# Patient Record
Sex: MALE | Race: BLACK OR AFRICAN AMERICAN | NOT HISPANIC OR LATINO | Employment: FULL TIME | URBAN - METROPOLITAN AREA
[De-identification: names, ages, dates, MRNs, and addresses within clinical notes are randomized per-mention and may not be internally consistent; named-entity substitution may affect disease eponyms.]

---

## 2024-08-26 ENCOUNTER — OFFICE VISIT (OUTPATIENT)
Dept: PRIMARY CARE | Facility: CLINIC | Age: 34
End: 2024-08-26
Payer: COMMERCIAL

## 2024-08-26 VITALS
SYSTOLIC BLOOD PRESSURE: 128 MMHG | BODY MASS INDEX: 43.88 KG/M2 | DIASTOLIC BLOOD PRESSURE: 91 MMHG | HEIGHT: 71 IN | TEMPERATURE: 97.5 F | RESPIRATION RATE: 16 BRPM | WEIGHT: 313.4 LBS | HEART RATE: 112 BPM | OXYGEN SATURATION: 98 %

## 2024-08-26 DIAGNOSIS — I10 PRIMARY HYPERTENSION: ICD-10-CM

## 2024-08-26 DIAGNOSIS — R00.0 TACHYCARDIA: Primary | ICD-10-CM

## 2024-08-26 DIAGNOSIS — E66.01 CLASS 3 SEVERE OBESITY DUE TO EXCESS CALORIES WITH BODY MASS INDEX (BMI) OF 40.0 TO 44.9 IN ADULT, UNSPECIFIED WHETHER SERIOUS COMORBIDITY PRESENT (MULTI): ICD-10-CM

## 2024-08-26 DIAGNOSIS — D64.9 ANEMIA, UNSPECIFIED TYPE: ICD-10-CM

## 2024-08-26 DIAGNOSIS — R79.89 LOW VITAMIN D LEVEL: ICD-10-CM

## 2024-08-26 DIAGNOSIS — F41.9 ANXIETY: ICD-10-CM

## 2024-08-26 DIAGNOSIS — Z11.3 SCREENING EXAMINATION FOR STI: ICD-10-CM

## 2024-08-26 PROBLEM — E66.9 OBESITY: Status: ACTIVE | Noted: 2024-08-26

## 2024-08-26 LAB
25(OH)D3 SERPL-MCNC: 28 NG/ML (ref 30–100)
ALBUMIN SERPL BCP-MCNC: 4.9 G/DL (ref 3.4–5)
ALP SERPL-CCNC: 49 U/L (ref 33–120)
ALT SERPL W P-5'-P-CCNC: 29 U/L (ref 10–52)
ANION GAP SERPL CALC-SCNC: 15 MMOL/L (ref 10–20)
AST SERPL W P-5'-P-CCNC: 28 U/L (ref 9–39)
BILIRUB SERPL-MCNC: 0.5 MG/DL (ref 0–1.2)
BUN SERPL-MCNC: 21 MG/DL (ref 6–23)
CALCIUM SERPL-MCNC: 10 MG/DL (ref 8.6–10.6)
CHLORIDE SERPL-SCNC: 100 MMOL/L (ref 98–107)
CHOLEST SERPL-MCNC: 258 MG/DL (ref 0–199)
CHOLESTEROL/HDL RATIO: 5.7
CO2 SERPL-SCNC: 29 MMOL/L (ref 21–32)
CREAT SERPL-MCNC: 1.61 MG/DL (ref 0.5–1.3)
EGFRCR SERPLBLD CKD-EPI 2021: 57 ML/MIN/1.73M*2
ERYTHROCYTE [DISTWIDTH] IN BLOOD BY AUTOMATED COUNT: 13.2 % (ref 11.5–14.5)
EST. AVERAGE GLUCOSE BLD GHB EST-MCNC: 128 MG/DL
GLUCOSE SERPL-MCNC: 63 MG/DL (ref 74–99)
HBA1C MFR BLD: 6.1 %
HCT VFR BLD AUTO: 43.1 % (ref 41–52)
HDLC SERPL-MCNC: 45 MG/DL
HGB BLD-MCNC: 14 G/DL (ref 13.5–17.5)
LDLC SERPL CALC-MCNC: 185 MG/DL
MCH RBC QN AUTO: 29.9 PG (ref 26–34)
MCHC RBC AUTO-ENTMCNC: 32.5 G/DL (ref 32–36)
MCV RBC AUTO: 92 FL (ref 80–100)
NON HDL CHOLESTEROL: 213 MG/DL (ref 0–149)
NRBC BLD-RTO: 0 /100 WBCS (ref 0–0)
PLATELET # BLD AUTO: 216 X10*3/UL (ref 150–450)
POTASSIUM SERPL-SCNC: 3.7 MMOL/L (ref 3.5–5.3)
PROT SERPL-MCNC: 8.8 G/DL (ref 6.4–8.2)
RBC # BLD AUTO: 4.69 X10*6/UL (ref 4.5–5.9)
SODIUM SERPL-SCNC: 140 MMOL/L (ref 136–145)
TRIGL SERPL-MCNC: 139 MG/DL (ref 0–149)
TSH SERPL-ACNC: 1.83 MIU/L (ref 0.44–3.98)
VLDL: 28 MG/DL (ref 0–40)
WBC # BLD AUTO: 9.8 X10*3/UL (ref 4.4–11.3)

## 2024-08-26 PROCEDURE — 3074F SYST BP LT 130 MM HG: CPT | Performed by: INTERNAL MEDICINE

## 2024-08-26 PROCEDURE — 99214 OFFICE O/P EST MOD 30 MIN: CPT | Mod: 25 | Performed by: INTERNAL MEDICINE

## 2024-08-26 PROCEDURE — 83036 HEMOGLOBIN GLYCOSYLATED A1C: CPT | Performed by: INTERNAL MEDICINE

## 2024-08-26 PROCEDURE — 99214 OFFICE O/P EST MOD 30 MIN: CPT | Performed by: INTERNAL MEDICINE

## 2024-08-26 PROCEDURE — 84443 ASSAY THYROID STIM HORMONE: CPT | Performed by: INTERNAL MEDICINE

## 2024-08-26 PROCEDURE — 82306 VITAMIN D 25 HYDROXY: CPT | Performed by: INTERNAL MEDICINE

## 2024-08-26 PROCEDURE — 3008F BODY MASS INDEX DOCD: CPT | Performed by: INTERNAL MEDICINE

## 2024-08-26 PROCEDURE — 87389 HIV-1 AG W/HIV-1&-2 AB AG IA: CPT | Performed by: INTERNAL MEDICINE

## 2024-08-26 PROCEDURE — 3080F DIAST BP >= 90 MM HG: CPT | Performed by: INTERNAL MEDICINE

## 2024-08-26 PROCEDURE — 80053 COMPREHEN METABOLIC PANEL: CPT | Performed by: INTERNAL MEDICINE

## 2024-08-26 PROCEDURE — 85027 COMPLETE CBC AUTOMATED: CPT | Performed by: INTERNAL MEDICINE

## 2024-08-26 PROCEDURE — 80061 LIPID PANEL: CPT | Performed by: INTERNAL MEDICINE

## 2024-08-26 PROCEDURE — 36415 COLL VENOUS BLD VENIPUNCTURE: CPT | Performed by: INTERNAL MEDICINE

## 2024-08-26 PROCEDURE — 86703 HIV-1/HIV-2 1 RESULT ANTBDY: CPT | Performed by: INTERNAL MEDICINE

## 2024-08-26 RX ORDER — ESCITALOPRAM OXALATE 10 MG/1
10 TABLET ORAL DAILY
COMMUNITY
End: 2024-08-26 | Stop reason: SDUPTHER

## 2024-08-26 RX ORDER — HYDROCHLOROTHIAZIDE 25 MG/1
1 TABLET ORAL DAILY
COMMUNITY
Start: 2021-08-18

## 2024-08-26 RX ORDER — CHOLECALCIFEROL (VITAMIN D3) 25 MCG
1 TABLET ORAL DAILY
COMMUNITY
Start: 2022-09-01

## 2024-08-26 RX ORDER — ESCITALOPRAM OXALATE 10 MG/1
10 TABLET ORAL DAILY
Qty: 90 TABLET | Refills: 1 | Status: SHIPPED | OUTPATIENT
Start: 2024-08-26

## 2024-08-26 RX ORDER — AMLODIPINE BESYLATE 5 MG/1
5 TABLET ORAL DAILY
COMMUNITY

## 2024-08-26 RX ORDER — LISINOPRIL 40 MG/1
1 TABLET ORAL DAILY
COMMUNITY
Start: 2021-09-15

## 2024-08-26 RX ORDER — MELOXICAM 15 MG/1
15 TABLET ORAL DAILY
COMMUNITY
Start: 2023-11-03

## 2024-08-26 SDOH — ECONOMIC STABILITY: FOOD INSECURITY: WITHIN THE PAST 12 MONTHS, THE FOOD YOU BOUGHT JUST DIDN'T LAST AND YOU DIDN'T HAVE MONEY TO GET MORE.: NEVER TRUE

## 2024-08-26 SDOH — ECONOMIC STABILITY: FOOD INSECURITY: WITHIN THE PAST 12 MONTHS, YOU WORRIED THAT YOUR FOOD WOULD RUN OUT BEFORE YOU GOT MONEY TO BUY MORE.: NEVER TRUE

## 2024-08-26 ASSESSMENT — ENCOUNTER SYMPTOMS
OCCASIONAL FEELINGS OF UNSTEADINESS: 0
LOSS OF SENSATION IN FEET: 0
DEPRESSION: 0

## 2024-08-26 ASSESSMENT — PATIENT HEALTH QUESTIONNAIRE - PHQ9
1. LITTLE INTEREST OR PLEASURE IN DOING THINGS: NOT AT ALL
2. FEELING DOWN, DEPRESSED OR HOPELESS: NOT AT ALL
SUM OF ALL RESPONSES TO PHQ9 QUESTIONS 1 AND 2: 0

## 2024-08-26 ASSESSMENT — PAIN SCALES - GENERAL: PAINLEVEL: 0-NO PAIN

## 2024-08-26 ASSESSMENT — LIFESTYLE VARIABLES: HOW OFTEN DO YOU HAVE SIX OR MORE DRINKS ON ONE OCCASION: LESS THAN MONTHLY

## 2024-08-26 NOTE — PROGRESS NOTES
Chief complaint:    HPI:  Sukhdev Manzano is a 33 y.o. male    Medications:  No current outpatient medications on file.    Allergies:  Not on File    Past medical history:  No past medical history on file.    Surgical history:  No past surgical history on file.    Family history:  No family history on file.    Social history:       Health maintenance:  Health Maintenance   Topic Date Due    Yearly Adult Physical  Never done    HIV Screening  Never done    MMR Vaccines (1 of 1 - Standard series) Never done    Varicella Vaccines (1 of 2 - 13+ 2-dose series) Never done    Hepatitis C Screening  Never done    Hepatitis B Vaccines (1 of 3 - 19+ 3-dose series) Never done    COVID-19 Vaccine (1 - 2023-24 season) Never done    Influenza Vaccine (1) 09/01/2024    DTaP/Tdap/Td Vaccines (2 - Td or Tdap) 09/07/2026    Lipid Panel  08/24/2027    Zoster Vaccines (1 of 2) 09/11/2039    HIB Vaccines  Aged Out    IPV Vaccines  Aged Out    Hepatitis A Vaccines  Aged Out    Meningococcal Vaccine  Aged Out    Rotavirus Vaccines  Aged Out    HPV Vaccines  Aged Out    Pneumococcal Vaccine: Pediatrics (0 to 5 Years) and At-Risk Patients (6 to 64 Years)  Aged Out       Vitals:  [unfilled]    Physical exam:  CVS: S1 and S2 present , no added sounds,  Chest: Clinically clear  Abd: no areas of tenderness or distentention  Extremities: no pedal edema  Neuro: A0X 3, No FND    Labs:  Lab Results   Component Value Date    WBC 10.6 08/24/2022    HGB 12.2 (L) 08/24/2022    HCT 37.2 (L) 08/24/2022    MCV 95 08/24/2022     08/24/2022       Lab Results   Component Value Date    GLUCOSE 102 (H) 08/24/2022    CALCIUM 9.1 08/24/2022     08/24/2022    K 3.9 08/24/2022    CO2 28 08/24/2022     08/24/2022    BUN 19 08/24/2022    CREATININE 1.64 (H) 08/24/2022       Lab Results   Component Value Date    HGBA1C 5.8 (A) 08/24/2022        Lab Results   Component Value Date    CHOL 176 08/24/2022    CHOL 211 (H) 08/18/2021     Lab Results  "  Component Value Date    HDL 34.5 (A) 08/24/2022    HDL 46.4 08/18/2021     No results found for: \"LDLCALC\"  Lab Results   Component Value Date    TRIG 181 (H) 08/24/2022    TRIG 85 08/18/2021     No components found for: \"CHOLHDL\"    Imaging:  No results found.    Assessment and plan:  Sukhdev Manzano is a 33 y.o. male    #Musculoskeletal pain/back pain  -Flexeril and Tylenol.Avoid NSAIDS 2/2 CKD.    # Low vitamin D   :: cont med      # HTN  # CKD (Creat-1.)   -refill meds    # Anxiety  cont med      #***  -    #***  -    #***  -    #***    Plan     Follow-up in     Patient and plan discussed with attending physician MD Javy Mckoy [facility]   "

## 2024-08-27 ENCOUNTER — TELEPHONE (OUTPATIENT)
Dept: IMMUNOLOGY | Facility: CLINIC | Age: 34
End: 2024-08-27
Payer: COMMERCIAL

## 2024-08-27 LAB
HIV 1 & 2 AB SERPL IA.RAPID: ABNORMAL
HIV 1+2 AB+HIV1 P24 AG SERPL QL IA: ABNORMAL

## 2024-08-27 ASSESSMENT — ENCOUNTER SYMPTOMS
SHORTNESS OF BREATH: 0
CHILLS: 0
NAUSEA: 0
VOMITING: 0
FEVER: 0
ABDOMINAL PAIN: 0

## 2024-08-27 NOTE — PROGRESS NOTES
"Subjective   Patient ID: Sukhdev Manzano is a 33 y.o. male who presents for Follow-up.    Presents for follow up. Hasn't been seen in awhile. Is working two jobs so doesn't always take his bp meds daily. Didn't take any today and is out of the Amlodipine. Admits to drinking energy drinks and a lot of coffee on a daily basis. Heart rate up today. No ne chest pain. No associated shortness of breath. Also not taking the Lexapro daily but feels he should.          Review of Systems   Constitutional:  Negative for chills and fever.   Respiratory:  Negative for shortness of breath.    Cardiovascular:  Negative for chest pain.   Gastrointestinal:  Negative for abdominal pain, nausea and vomiting.       Objective   BP (!) 128/91   Pulse (!) 112   Temp 36.4 °C (97.5 °F)   Resp 16   Ht 1.803 m (5' 11\")   Wt 142 kg (313 lb 6.4 oz)   SpO2 98%   BMI 43.71 kg/m²     Physical Exam  Gen appearance: well groomed in NAD  A & O: alert and oriented x 3  CV: RRR   Lungs: CTA bilaterally  Extr: no edema   Assessment/Plan   Anxiety: we discussed taking the med daily.  HTN: will check labs and restart meds appropriately  Tachycardia: most likely 2/2 increased daily caffeine use. Will check TSH  Low vitamin d: check level  H/o anemia: check labs  Obesity: check labs. Diet/exercise  HIV testing per request.  Will call you with lab results.        "

## 2024-08-27 NOTE — TELEPHONE ENCOUNTER
Time Spent: 15 mins   EIS received referral from  Javy Miranda RN (Kat Wiseman)   Pt newly dx on 08/26/2024  EIS reached out to pt. Pt available to answer call.  Pt identified support system (friends and sister)  EIS scheduled NPV   EIS screened for transportation barriers.   Pt commutes via personal vehicle. Pt currently does not experience transportation barriers.   Pt identified financial responsibility as potential barrier to care   EIS will requested POI (paystubs) to complete RW    PLAN:   EIS will f/U for NPV reminder   EIS will complete RW   EIS will complete EIS Plan, newly dx   EIS will link pt to additional financial support resources  EIS will f/U to engage in care

## 2024-08-30 NOTE — TELEPHONE ENCOUNTER
Time Spent: 5 mins   Pt called EIS with questions regarding U=U and antibodies   EIS notified RN (Barbara Baez) to inform further     PLAN:   EIS will f/U for NPV reminder   EIS will complete RW   EIS will complete EIS Plan, newly dx   EIS will link pt to additional financial support resources  EIS will f/U to engage in care

## 2024-09-05 NOTE — TELEPHONE ENCOUNTER
Time Spent: 5 mins   EIS reached out to pt. Pt available to answer call.  Pt confirmed NPV     PLAN:   EIS will complete RW   EIS will complete EIS Plan, newly dx   EIS will link pt to additional financial support resources  EIS will f/U to engage in care

## 2024-09-06 ENCOUNTER — DOCUMENTATION (OUTPATIENT)
Dept: IMMUNOLOGY | Facility: CLINIC | Age: 34
End: 2024-09-06
Payer: COMMERCIAL

## 2024-09-06 ENCOUNTER — OFFICE VISIT (OUTPATIENT)
Dept: IMMUNOLOGY | Facility: CLINIC | Age: 34
End: 2024-09-06
Payer: COMMERCIAL

## 2024-09-06 VITALS
BODY MASS INDEX: 44.07 KG/M2 | WEIGHT: 315 LBS | OXYGEN SATURATION: 96 % | DIASTOLIC BLOOD PRESSURE: 112 MMHG | HEART RATE: 95 BPM | SYSTOLIC BLOOD PRESSURE: 160 MMHG

## 2024-09-06 DIAGNOSIS — Z77.21 PERSONAL HISTORY OF EXPOSURE TO POTENTIALLY HAZARDOUS BODY FLUIDS: ICD-10-CM

## 2024-09-06 DIAGNOSIS — R79.89 ELEVATED SERUM CREATININE: ICD-10-CM

## 2024-09-06 DIAGNOSIS — I10 PRIMARY HYPERTENSION: Primary | ICD-10-CM

## 2024-09-06 DIAGNOSIS — B20 HIV DISEASE (MULTI): ICD-10-CM

## 2024-09-06 LAB
ALBUMIN SERPL BCP-MCNC: 4.9 G/DL (ref 3.4–5)
ALP SERPL-CCNC: 55 U/L (ref 33–120)
ALT SERPL W P-5'-P-CCNC: 17 U/L (ref 10–52)
ANION GAP SERPL CALC-SCNC: 13 MMOL/L (ref 10–20)
AST SERPL W P-5'-P-CCNC: 15 U/L (ref 9–39)
BASOPHILS # BLD AUTO: 0.03 X10*3/UL (ref 0–0.1)
BASOPHILS NFR BLD AUTO: 0.3 %
BILIRUB SERPL-MCNC: 0.5 MG/DL (ref 0–1.2)
BUN SERPL-MCNC: 27 MG/DL (ref 6–23)
C TRACH RRNA SPEC QL NAA+PROBE: NEGATIVE
C TRACH RRNA SPEC QL NAA+PROBE: NEGATIVE
CALCIUM SERPL-MCNC: 10 MG/DL (ref 8.6–10.6)
CHLORIDE SERPL-SCNC: 105 MMOL/L (ref 98–107)
CMV IGG AVIDITY SERPL IA-RTO: REACTIVE %
CO2 SERPL-SCNC: 25 MMOL/L (ref 21–32)
CREAT SERPL-MCNC: 1.66 MG/DL (ref 0.5–1.3)
EGFRCR SERPLBLD CKD-EPI 2021: 55 ML/MIN/1.73M*2
EOSINOPHIL # BLD AUTO: 0.31 X10*3/UL (ref 0–0.7)
EOSINOPHIL NFR BLD AUTO: 3.4 %
ERYTHROCYTE [DISTWIDTH] IN BLOOD BY AUTOMATED COUNT: 12.8 % (ref 11.5–14.5)
GLUCOSE SERPL-MCNC: 100 MG/DL (ref 74–99)
HAV AB SER QL IA: NONREACTIVE
HBV CORE AB SER QL: NONREACTIVE
HBV SURFACE AB SER-ACNC: <3.1 MIU/ML
HBV SURFACE AG SERPL QL IA: NONREACTIVE
HCT VFR BLD AUTO: 40 % (ref 41–52)
HCV AB SER QL: NONREACTIVE
HGB BLD-MCNC: 13.1 G/DL (ref 13.5–17.5)
IMM GRANULOCYTES # BLD AUTO: 0.01 X10*3/UL (ref 0–0.7)
IMM GRANULOCYTES NFR BLD AUTO: 0.1 % (ref 0–0.9)
LYMPHOCYTES # BLD AUTO: 2.79 X10*3/UL (ref 1.2–4.8)
LYMPHOCYTES NFR BLD AUTO: 30.5 %
MCH RBC QN AUTO: 29.7 PG (ref 26–34)
MCHC RBC AUTO-ENTMCNC: 32.8 G/DL (ref 32–36)
MCV RBC AUTO: 91 FL (ref 80–100)
MONOCYTES # BLD AUTO: 0.5 X10*3/UL (ref 0.1–1)
MONOCYTES NFR BLD AUTO: 5.5 %
N GONORRHOEA DNA SPEC QL PROBE+SIG AMP: NEGATIVE
N GONORRHOEA DNA SPEC QL PROBE+SIG AMP: NEGATIVE
NEUTROPHILS # BLD AUTO: 5.5 X10*3/UL (ref 1.2–7.7)
NEUTROPHILS NFR BLD AUTO: 60.2 %
NRBC BLD-RTO: 0 /100 WBCS (ref 0–0)
PLATELET # BLD AUTO: 217 X10*3/UL (ref 150–450)
POTASSIUM SERPL-SCNC: 4.2 MMOL/L (ref 3.5–5.3)
PROT SERPL-MCNC: 8.1 G/DL (ref 6.4–8.2)
RBC # BLD AUTO: 4.41 X10*6/UL (ref 4.5–5.9)
SODIUM SERPL-SCNC: 139 MMOL/L (ref 136–145)
T GONDII IGG SER-ACNC: NONREACTIVE
TREPONEMA PALLIDUM IGG+IGM AB [PRESENCE] IN SERUM OR PLASMA BY IMMUNOASSAY: NONREACTIVE
WBC # BLD AUTO: 9.1 X10*3/UL (ref 4.4–11.3)

## 2024-09-06 PROCEDURE — 86704 HEP B CORE ANTIBODY TOTAL: CPT | Performed by: FAMILY MEDICINE

## 2024-09-06 PROCEDURE — 86708 HEPATITIS A ANTIBODY: CPT | Performed by: FAMILY MEDICINE

## 2024-09-06 PROCEDURE — 87491 CHLMYD TRACH DNA AMP PROBE: CPT | Performed by: FAMILY MEDICINE

## 2024-09-06 PROCEDURE — 88185 FLOWCYTOMETRY/TC ADD-ON: CPT | Performed by: FAMILY MEDICINE

## 2024-09-06 PROCEDURE — 86777 TOXOPLASMA ANTIBODY: CPT | Performed by: FAMILY MEDICINE

## 2024-09-06 PROCEDURE — 87340 HEPATITIS B SURFACE AG IA: CPT | Performed by: FAMILY MEDICINE

## 2024-09-06 PROCEDURE — 82960 TEST FOR G6PD ENZYME: CPT | Performed by: FAMILY MEDICINE

## 2024-09-06 PROCEDURE — 81381 HLA I TYPING 1 ALLELE HR: CPT | Performed by: FAMILY MEDICINE

## 2024-09-06 PROCEDURE — 3077F SYST BP >= 140 MM HG: CPT | Performed by: FAMILY MEDICINE

## 2024-09-06 PROCEDURE — 86706 HEP B SURFACE ANTIBODY: CPT | Performed by: FAMILY MEDICINE

## 2024-09-06 PROCEDURE — 80053 COMPREHEN METABOLIC PANEL: CPT | Performed by: FAMILY MEDICINE

## 2024-09-06 PROCEDURE — 87536 HIV-1 QUANT&REVRSE TRNSCRPJ: CPT | Performed by: FAMILY MEDICINE

## 2024-09-06 PROCEDURE — 85025 COMPLETE CBC W/AUTO DIFF WBC: CPT | Performed by: FAMILY MEDICINE

## 2024-09-06 PROCEDURE — 3080F DIAST BP >= 90 MM HG: CPT | Performed by: FAMILY MEDICINE

## 2024-09-06 PROCEDURE — 99214 OFFICE O/P EST MOD 30 MIN: CPT | Performed by: FAMILY MEDICINE

## 2024-09-06 PROCEDURE — 86645 CMV ANTIBODY IGM: CPT | Performed by: FAMILY MEDICINE

## 2024-09-06 PROCEDURE — 86780 TREPONEMA PALLIDUM: CPT | Performed by: FAMILY MEDICINE

## 2024-09-06 PROCEDURE — 86803 HEPATITIS C AB TEST: CPT | Performed by: FAMILY MEDICINE

## 2024-09-06 PROCEDURE — 86644 CMV ANTIBODY: CPT | Performed by: FAMILY MEDICINE

## 2024-09-06 PROCEDURE — 36415 COLL VENOUS BLD VENIPUNCTURE: CPT | Performed by: FAMILY MEDICINE

## 2024-09-06 PROCEDURE — 86481 TB AG RESPONSE T-CELL SUSP: CPT | Performed by: FAMILY MEDICINE

## 2024-09-06 RX ORDER — BICTEGRAVIR SODIUM, EMTRICITABINE, AND TENOFOVIR ALAFENAMIDE FUMARATE 50; 200; 25 MG/1; MG/1; MG/1
1 TABLET ORAL DAILY
Qty: 30 TABLET | Refills: 1 | Status: SHIPPED | OUTPATIENT
Start: 2024-09-06

## 2024-09-06 RX ORDER — AMLODIPINE BESYLATE 5 MG/1
5 TABLET ORAL DAILY
Qty: 30 TABLET | Refills: 5 | Status: SHIPPED | OUTPATIENT
Start: 2024-09-06

## 2024-09-06 RX ORDER — LISINOPRIL 40 MG/1
40 TABLET ORAL DAILY
Qty: 30 TABLET | Refills: 5 | Status: SHIPPED | OUTPATIENT
Start: 2024-09-06

## 2024-09-06 RX ORDER — HYDROCHLOROTHIAZIDE 25 MG/1
25 TABLET ORAL DAILY
Qty: 30 TABLET | Refills: 5 | Status: SHIPPED | OUTPATIENT
Start: 2024-09-06

## 2024-09-06 NOTE — PROGRESS NOTES
Time spent: 30 mins  EIS: Newly Diagnosed    Pt in to IVET for new Dx RN intake. Pt also meets with Dr. Augustin for rapid initiation of HAART, and is scheduled to see Dr. Matta for ongoing HIV care (d/t scheduling needs). Pt Dx 8/26/24 via a screen by his PCP. Jenniffer meets with Pt to introduce themself and role of social work at the [facility]. Pt works full time at [employer] - has EOI, has some concerns about ART co-pay. Sw and Pt discuss co-pay card vs. OHDAP. Pt has other medications as well as new ART Rx, is agreeable to OHDAP and signs necessary forms - has all the needed documents for Sw to complete application. Jenniffer advises that Pt should be able to access Rx by 9/9 as long as ADAP application approved by end of day today - they will request same. Pt states that he prefers texts so that he can get messages even while at work. Sw will text Pt once ADAP approved. Jenniffer provides Pt with their contact information, and also with a copy of ADAP ID that he can give to pharmacy once he goes to  his medications. Sw explains use - secondary to his primary insurance, etc. Pt to call, email, or text Swk cell if he has any issues with obtaining meds next week. Jenniffer will plan to meet with Pt at his NPV with Dr. Matta on 10/24. Pt aware he can reach out to RN or this Sw if he has any questions or concerns in the meantime.     Jenniffer completes Pt's ADAP application in [facility] and requests expedited approval as Pt is rapid-start. Pt's application approved in afternoon. Jenniffer texts Pt to advise him of same, will follow up next week to make sure that Pt received Rx.

## 2024-09-06 NOTE — PROGRESS NOTES
Subjective   Patient ID: Sukhdev Manzano is a 33 y.o. who presents to establish care for new HIV diagnosis     HPI    New HIV diagnosis  - Went to PCP on 8/26 and had HIV test, which came back positive. He was asymptomatic at the time. Last negative test was 3 years ago  - Since finding out about diagnosis feels foggy, more tired  - He initially felt overwhelmed, starting to slowly feel more like himself  - He has told siblings, best friends, parter  - Partner tested negative, has split with him since then  - 2 partners in the last 3 months. He is aware of partner notification   - Overall has been feeling well- no cough, headaches, no abdominal pain, diarrhea, shortness of breath, weight loss, fevers, chills  - Last test was 3 years ago  - No prior STDs  - No significant incarceration in the past    HTN-   - History of hypertension, taking hydrochlorothiazide, amlodipine, lisinopril  - Felt ok when taking all 3, has been out since 8/26  - Didn't always take meds consistently  - Does have BP cuff at home, has been normal when taking medications  - No headaches, chest pain, shortness of breath, vision changes, dizziness    HLD- recently diagnosed     Surgical history: None  Family history of DM, HTN, congestive heart failure  Allergies: NKDA  Social history: No smoking, social alcohol use, Marijuana use. Currently works as inventory management.     Review of Systems  10 point review of systems negative except as noted in HPI.    Objective     BP (!) 160/112 (BP Location: Right arm, Patient Position: Sitting, BP Cuff Size: Large adult)   Pulse 95   Wt 143 kg (316 lb)   SpO2 96%   BMI 44.07 kg/m²   General: well appearing, no distress  Neck: No lymphadenopathy, no thyromegaly, no thrush  CV: Regular rate and rhythm, no murmur  Lungs: Clear to auscultation bilaterally  Abdomen: Soft, nontender, nondistended  Extremities: No edema noted  Psych: Appropriate mood and affect     Assessment/Plan   32 yo here to  establish care     #HIV  - Reviewed treatment, importance of compliance, U=U, partner notification, etc  - Discussed rapid start options. Will begin with Biktarvy. We discussed need for close monitoring of kidney function. Discussed possible side effects  - Link with social work services  - Obtain baseline labs as ordered    #HTN  - Will resume antihypertensive regimen. Monitor BP at home    #Elevated creatinine- appears to have CKD based on prior labs  - Suspect 2/2 hypertension. Will monitor creatinine closely and consider further workup pending future lab results    #HLD  - Likely needs statin, will discuss at future visit    Follow-up 1 month or sooner as needed, will link with Dr. Matta due to scheduling preference of patient

## 2024-09-06 NOTE — PROGRESS NOTES
Time Spent: 15 mins   EIS met with pt during NPV   Pt arrived with identified support system (sister)   EIS completed RW   EIS shared financial support contact information ([place], [employer],  [facility], and  [facility]) via text.     PLAN:   EIS will f/U for EST f/U reminder  EIS will complete EIS Plan   EIS will f/U with financial assistance progress   EIS will f/U to retain in care

## 2024-09-07 LAB — G6PD RBC QL: NORMAL

## 2024-09-08 LAB
CD3+CD4+ CELLS # BLD: 0.34 X10E9/L
CD3+CD4+ CELLS # BLD: 335 /MM3
CD3+CD4+ CELLS NFR BLD: 12 %
CD3+CD4+ CELLS/CD3+CD8+ CLL BLD: 0.17 %
CD3+CD8+ CELLS # BLD: 1.95 X10E9/L
CD3+CD8+ CELLS NFR BLD: 70 %
HIV1 RNA # PLAS NAA DL=20: 7960 COPIES/ML
HIV1 RNA # PLAS NAA DL=20: DETECTED {COPIES}/ML
HIV1 RNA SPEC NAA+PROBE-LOG#: 3.9 LOG10 CPY/ML
LYMPHOCYTES # SPEC AUTO: 2.79 X10*3/UL
NIL(NEG) CONTROL SPOT COUNT: NORMAL
PANEL A SPOT COUNT: 0
PANEL B SPOT COUNT: 0
POS CONTROL SPOT COUNT: NORMAL
T-SPOT. TB INTERPRETATION: NEGATIVE

## 2024-09-09 ENCOUNTER — TELEPHONE (OUTPATIENT)
Dept: IMMUNOLOGY | Facility: CLINIC | Age: 34
End: 2024-09-09
Payer: COMMERCIAL

## 2024-09-09 LAB — CMV IGM SERPL-ACNC: <8 AU/ML

## 2024-09-09 NOTE — TELEPHONE ENCOUNTER
Time spent: 15 mins  EIS: Newly Diagnosed    Pt leaves  and texts Swk cell that he's having issues with getting his new ART Rx at [facility]. Jenniffer calls pharmacy to check on concern. Jenniffer verifies Pt's ADAP information - apparently pharmacy tech entered ID # incorrectly. Re-entered ID accepted. Pt's ART processed at $0. Jenniffer advises pharmacist that all of Pt's meds can be processed through ADAP. Jenniffer texts Pt to update him - advises him to reach back out if any further issues come up. Jenniffer will continue to follow.

## 2024-09-10 LAB — DNA HLA-B5701: NEGATIVE

## 2024-09-18 LAB
ELECTRONICALLY SIGNED BY: NORMAL
HIV GENOTYPE RESULTS: NORMAL

## 2024-10-14 ENCOUNTER — OFFICE VISIT (OUTPATIENT)
Dept: PRIMARY CARE | Facility: CLINIC | Age: 34
End: 2024-10-14
Payer: COMMERCIAL

## 2024-10-14 VITALS
OXYGEN SATURATION: 98 % | DIASTOLIC BLOOD PRESSURE: 75 MMHG | BODY MASS INDEX: 44.1 KG/M2 | WEIGHT: 315 LBS | HEIGHT: 71 IN | TEMPERATURE: 98.1 F | HEART RATE: 108 BPM | SYSTOLIC BLOOD PRESSURE: 111 MMHG | RESPIRATION RATE: 16 BRPM

## 2024-10-14 DIAGNOSIS — I10 PRIMARY HYPERTENSION: Primary | ICD-10-CM

## 2024-10-14 DIAGNOSIS — Z21 HIV INFECTION, UNSPECIFIED SYMPTOM STATUS: ICD-10-CM

## 2024-10-14 DIAGNOSIS — M54.9 CHRONIC BACK PAIN, UNSPECIFIED BACK LOCATION, UNSPECIFIED BACK PAIN LATERALITY: ICD-10-CM

## 2024-10-14 DIAGNOSIS — R79.89 LOW VITAMIN D LEVEL: ICD-10-CM

## 2024-10-14 DIAGNOSIS — F41.9 ANXIETY: ICD-10-CM

## 2024-10-14 DIAGNOSIS — G89.29 CHRONIC BACK PAIN, UNSPECIFIED BACK LOCATION, UNSPECIFIED BACK PAIN LATERALITY: ICD-10-CM

## 2024-10-14 PROCEDURE — 3074F SYST BP LT 130 MM HG: CPT | Performed by: INTERNAL MEDICINE

## 2024-10-14 PROCEDURE — 3078F DIAST BP <80 MM HG: CPT | Performed by: INTERNAL MEDICINE

## 2024-10-14 PROCEDURE — 3008F BODY MASS INDEX DOCD: CPT | Performed by: INTERNAL MEDICINE

## 2024-10-14 PROCEDURE — 99214 OFFICE O/P EST MOD 30 MIN: CPT | Performed by: INTERNAL MEDICINE

## 2024-10-14 RX ORDER — CHOLECALCIFEROL (VITAMIN D3) 25 MCG
TABLET ORAL
Qty: 90 TABLET | Refills: 1 | Status: SHIPPED | OUTPATIENT
Start: 2024-10-14

## 2024-10-14 RX ORDER — MELOXICAM 15 MG/1
15 TABLET ORAL DAILY
Qty: 90 TABLET | Refills: 0 | Status: SHIPPED | OUTPATIENT
Start: 2024-10-14

## 2024-10-14 SDOH — ECONOMIC STABILITY: FOOD INSECURITY: WITHIN THE PAST 12 MONTHS, YOU WORRIED THAT YOUR FOOD WOULD RUN OUT BEFORE YOU GOT MONEY TO BUY MORE.: NEVER TRUE

## 2024-10-14 SDOH — ECONOMIC STABILITY: FOOD INSECURITY: WITHIN THE PAST 12 MONTHS, THE FOOD YOU BOUGHT JUST DIDN'T LAST AND YOU DIDN'T HAVE MONEY TO GET MORE.: NEVER TRUE

## 2024-10-14 ASSESSMENT — ENCOUNTER SYMPTOMS
OCCASIONAL FEELINGS OF UNSTEADINESS: 0
SHORTNESS OF BREATH: 0
DEPRESSION: 0
NECK PAIN: 1
BLURRED VISION: 0
SWEATS: 1
HYPERTENSION: 1
HEADACHES: 1
PALPITATIONS: 0
ORTHOPNEA: 0
PND: 0
LOSS OF SENSATION IN FEET: 0

## 2024-10-14 ASSESSMENT — PATIENT HEALTH QUESTIONNAIRE - PHQ9
SUM OF ALL RESPONSES TO PHQ9 QUESTIONS 1 AND 2: 0
2. FEELING DOWN, DEPRESSED OR HOPELESS: NOT AT ALL
1. LITTLE INTEREST OR PLEASURE IN DOING THINGS: NOT AT ALL

## 2024-10-14 ASSESSMENT — LIFESTYLE VARIABLES: HOW MANY STANDARD DRINKS CONTAINING ALCOHOL DO YOU HAVE ON A TYPICAL DAY: PATIENT DOES NOT DRINK

## 2024-10-14 ASSESSMENT — PAIN SCALES - GENERAL: PAINLEVEL: 4

## 2024-10-14 NOTE — PROGRESS NOTES
"Subjective   Patient ID: Sukhdev Manzano is a 34 y.o. male who presents for follow up. Doing well. Needs some refills. Has been seen in IVET       Objective   /75   Pulse 108   Temp 36.7 °C (98.1 °F)   Resp 16   Ht 1.803 m (5' 11\")   Wt 144 kg (317 lb 6.4 oz)   SpO2 98%   BMI 44.27 kg/m²     Physical Exam  Gen appearance: well groomed in NAD  A & O: alert and oriented x 3  CV: RRR   Lungs: CTA bilaterally  Extr: no edema    Assessment/Plan   HTN: cont meds  Low vitamin D: restart med  Anxiety: cont med  HIV: f/up with IVET  5.   Chronic back pain: meloxicam as needed but not too frequently. We discussed your kidney function numbers.  6.   RTO 6 mos or sooner as needed  7. I spent > 45 minutes with chart review, H & P, medication rec and ordering, assessment and plan with over 50% of time with care coordination for the patient.      "

## 2024-10-23 NOTE — TELEPHONE ENCOUNTER
Time Spent: 5 mins   EIS reached out to pt. Pt available to answer call.  Pt confirmed NPV (physician)     PLAN:   EIS will complete EIS Plan, newly dx   EIS will link pt to additional financial support resources  EIS will f/U to engage in care

## 2024-10-24 ENCOUNTER — DOCUMENTATION (OUTPATIENT)
Dept: IMMUNOLOGY | Facility: CLINIC | Age: 34
End: 2024-10-24

## 2024-10-24 ENCOUNTER — OFFICE VISIT (OUTPATIENT)
Dept: IMMUNOLOGY | Facility: CLINIC | Age: 34
End: 2024-10-24
Payer: COMMERCIAL

## 2024-10-24 VITALS
DIASTOLIC BLOOD PRESSURE: 87 MMHG | OXYGEN SATURATION: 97 % | SYSTOLIC BLOOD PRESSURE: 125 MMHG | RESPIRATION RATE: 16 BRPM | HEART RATE: 103 BPM | WEIGHT: 315 LBS | TEMPERATURE: 96.7 F | BODY MASS INDEX: 43.96 KG/M2

## 2024-10-24 DIAGNOSIS — E66.01 CLASS 3 SEVERE OBESITY DUE TO EXCESS CALORIES WITH BODY MASS INDEX (BMI) OF 40.0 TO 44.9 IN ADULT, UNSPECIFIED WHETHER SERIOUS COMORBIDITY PRESENT: ICD-10-CM

## 2024-10-24 DIAGNOSIS — Z21 ASYMPTOMATIC HIV INFECTION, WITH NO HISTORY OF HIV-RELATED ILLNESS (MULTI): Primary | ICD-10-CM

## 2024-10-24 DIAGNOSIS — E66.813 CLASS 3 SEVERE OBESITY DUE TO EXCESS CALORIES WITH BODY MASS INDEX (BMI) OF 40.0 TO 44.9 IN ADULT, UNSPECIFIED WHETHER SERIOUS COMORBIDITY PRESENT: ICD-10-CM

## 2024-10-24 LAB
ALBUMIN SERPL BCP-MCNC: 4.9 G/DL (ref 3.4–5)
ALP SERPL-CCNC: 57 U/L (ref 33–120)
ALT SERPL W P-5'-P-CCNC: 16 U/L (ref 10–52)
ANION GAP SERPL CALC-SCNC: 14 MMOL/L (ref 10–20)
AST SERPL W P-5'-P-CCNC: 15 U/L (ref 9–39)
BASOPHILS # BLD AUTO: 0.06 X10*3/UL (ref 0–0.1)
BASOPHILS NFR BLD AUTO: 0.5 %
BILIRUB SERPL-MCNC: 0.8 MG/DL (ref 0–1.2)
BUN SERPL-MCNC: 25 MG/DL (ref 6–23)
CALCIUM SERPL-MCNC: 10.5 MG/DL (ref 8.6–10.6)
CHLORIDE SERPL-SCNC: 103 MMOL/L (ref 98–107)
CO2 SERPL-SCNC: 28 MMOL/L (ref 21–32)
CREAT SERPL-MCNC: 1.95 MG/DL (ref 0.5–1.3)
EGFRCR SERPLBLD CKD-EPI 2021: 45 ML/MIN/1.73M*2
EOSINOPHIL # BLD AUTO: 0.41 X10*3/UL (ref 0–0.7)
EOSINOPHIL NFR BLD AUTO: 3.4 %
ERYTHROCYTE [DISTWIDTH] IN BLOOD BY AUTOMATED COUNT: 13.6 % (ref 11.5–14.5)
GLUCOSE SERPL-MCNC: 95 MG/DL (ref 74–99)
HCT VFR BLD AUTO: 41.9 % (ref 41–52)
HGB BLD-MCNC: 14.1 G/DL (ref 13.5–17.5)
IMM GRANULOCYTES # BLD AUTO: 0.04 X10*3/UL (ref 0–0.7)
IMM GRANULOCYTES NFR BLD AUTO: 0.3 % (ref 0–0.9)
LYMPHOCYTES # BLD AUTO: 4.11 X10*3/UL (ref 1.2–4.8)
LYMPHOCYTES NFR BLD AUTO: 33.9 %
MCH RBC QN AUTO: 31 PG (ref 26–34)
MCHC RBC AUTO-ENTMCNC: 33.7 G/DL (ref 32–36)
MCV RBC AUTO: 92 FL (ref 80–100)
MONOCYTES # BLD AUTO: 0.67 X10*3/UL (ref 0.1–1)
MONOCYTES NFR BLD AUTO: 5.5 %
NEUTROPHILS # BLD AUTO: 6.82 X10*3/UL (ref 1.2–7.7)
NEUTROPHILS NFR BLD AUTO: 56.4 %
NRBC BLD-RTO: 0 /100 WBCS (ref 0–0)
PLATELET # BLD AUTO: 267 X10*3/UL (ref 150–450)
POTASSIUM SERPL-SCNC: 4.4 MMOL/L (ref 3.5–5.3)
PROT SERPL-MCNC: 8.2 G/DL (ref 6.4–8.2)
RBC # BLD AUTO: 4.55 X10*6/UL (ref 4.5–5.9)
SODIUM SERPL-SCNC: 141 MMOL/L (ref 136–145)
WBC # BLD AUTO: 12.1 X10*3/UL (ref 4.4–11.3)

## 2024-10-24 PROCEDURE — 3079F DIAST BP 80-89 MM HG: CPT | Performed by: INTERNAL MEDICINE

## 2024-10-24 PROCEDURE — 99204 OFFICE O/P NEW MOD 45 MIN: CPT | Performed by: INTERNAL MEDICINE

## 2024-10-24 PROCEDURE — 3074F SYST BP LT 130 MM HG: CPT | Performed by: INTERNAL MEDICINE

## 2024-10-24 PROCEDURE — 99214 OFFICE O/P EST MOD 30 MIN: CPT | Performed by: INTERNAL MEDICINE

## 2024-10-24 PROCEDURE — 36415 COLL VENOUS BLD VENIPUNCTURE: CPT | Performed by: INTERNAL MEDICINE

## 2024-10-24 PROCEDURE — 88185 FLOWCYTOMETRY/TC ADD-ON: CPT | Performed by: INTERNAL MEDICINE

## 2024-10-24 PROCEDURE — 80053 COMPREHEN METABOLIC PANEL: CPT | Performed by: INTERNAL MEDICINE

## 2024-10-24 PROCEDURE — 1036F TOBACCO NON-USER: CPT | Performed by: INTERNAL MEDICINE

## 2024-10-24 PROCEDURE — 90636 HEP A/HEP B VACC ADULT IM: CPT | Performed by: INTERNAL MEDICINE

## 2024-10-24 PROCEDURE — 90656 IIV3 VACC NO PRSV 0.5 ML IM: CPT | Performed by: INTERNAL MEDICINE

## 2024-10-24 PROCEDURE — 4274F FLU IMMUNO ADMIND RCVD: CPT | Performed by: INTERNAL MEDICINE

## 2024-10-24 PROCEDURE — 87536 HIV-1 QUANT&REVRSE TRNSCRPJ: CPT | Performed by: INTERNAL MEDICINE

## 2024-10-24 PROCEDURE — 85025 COMPLETE CBC W/AUTO DIFF WBC: CPT | Performed by: INTERNAL MEDICINE

## 2024-10-24 PROCEDURE — 90677 PCV20 VACCINE IM: CPT | Performed by: INTERNAL MEDICINE

## 2024-10-24 ASSESSMENT — ENCOUNTER SYMPTOMS
DIFFICULTY URINATING: 0
CONSTITUTIONAL NEGATIVE: 1
NEUROLOGICAL NEGATIVE: 1
DYSURIA: 0
CARDIOVASCULAR NEGATIVE: 1
RESPIRATORY NEGATIVE: 1
NERVOUS/ANXIOUS: 1
GASTROINTESTINAL NEGATIVE: 1
EYES NEGATIVE: 1
MUSCULOSKELETAL NEGATIVE: 1

## 2024-10-24 ASSESSMENT — PAIN SCALES - GENERAL: PAINLEVEL_OUTOF10: 0-NO PAIN

## 2024-10-24 NOTE — PROGRESS NOTES
Time Spent: 15 mins   EARLY INTERVENTION ENCOUNTER PLAN    EIS Encounter Definition:    [x] New Diagnosis [] Referral  [] Receiving HIV/AIDS Services without PCP [] Returning to Care  [] Unaware of HIV Status  [] Retaining in Care      Does the patient understand the patient grievance procedures? ([x]Y or []N)  Speak Up - About your care information provided and reviewed with patient? ([x]Y or []N)  Is the patient aware who to call in the IVET when they have questions? ([x]Y or []N)      Gauge patients understanding on the following: (Scale: 1=No knowledge - 5=Very knowledgeable)  How is HIV transmitted?  [] 1 [] 2 [] 3 [] 4 [x] 5  U=U (Undetectable = Untransmittable)  [] 1 [] 2 [] 3 [] 4 [x] 5  ART/ARV (Antiretroviral Medication)  [] 1 [] 2 [] 3 [] 4 [x] 5  Viral loads (Amount of virus)  [] 1 [] 2 [] 3 [] 4 [x] 5  CD4 (White blood cells)  [] 1 [] 2 [] 3 [] 4 [x] 5    EIS will f/U with pt on any topics scored below 5.     First visit:  Review patients' area of needs:  [] Basic Needs   [] Housing   [] Specialty Care Referral                      [] Mental Health   [] Recovery Programming    [] Care & Medication Adherence  [] Oral Health   [] Health Insurance  [x] Financial Planning/Counseling  [] Transportation   [] Accessibility Assistance  [] Language & Literacy   [] Safety               [] Support System  [] Sexual Health Services   [] Knowledge of HIV Care  [] Legal Assistance             [] Other    How does the patient best interpret information?   []Visual  [x]In-Person / Over the Phone [x]Digital Communication (SMS/Email)  []Text/Readings []Translation      Are there any communication services the patient needs to remain in care?   [x]Phone calls   [x]Text Messages  []Mail   []Email   []Home visits    If yes, please indicate additional details: N/A    What actions will the patient engage in to assist in maintaining 1 year goal?     EIS NOTES:   EIS met with pt during NPV (physician)   Pt noted medication  adherence routine (before work) and identified no missed days of medication throughout October   Pt stated understanding of following education topics: U=U, transmission, and IVET care set up   EIS reviewed resource list for mortgage support   Pt will f/U with EIS on where pt would like direct referral to     EIS PLAN:   EIS will f/U with financial assistance (mortgage support) referral pending pt response   EIS will f/U with EIS Care Plan, newly dx   EIS will f/U to retain in care    Name: Kristal Eduardo [phone]  [email or web address]

## 2024-10-24 NOTE — PROGRESS NOTES
HIV Clinic Initial Visit Note    Sukhdev Manzano is a 34 y.o. year old male being seen today for their initial visit in the [facility] for HIV infection.    Primary Care physician is Ines Sheppard MD    Sukhdev Manzano first tested positive 8/26/24.  The lowest CD4 count known is/was 335 /12%.     Went to PCP on 8/26 and had HIV test, which came back positive. He was asymptomatic at the time. Last negative test was 3 years ago. Has never had any other STIs in past.   Prescribed Biktarvy as Rapid Start which he started on 9/10/24.   He is overall feeling good. Reports no issues with medications and has been compliant with them.     His ROS is overall negative.  We discussed weight loss and dietary needs as he has a strong family h/o DM and he is interested in losing weight.  Agreeable to speak/meet with dietician at next visit and/or over phone before next visit.    On review, he states that he drinks a lot of juice, not much pop or water.  Does not always eat 3 meals / day.  Has juice at home with dinner.  Drinks as much as 20oz at a time, mostly orange juice.  Last meals were chicken wings and cabbage.  Does not eat a lot of candy.      HIV-1 RNA Viral Load   Date Value Ref Range Status   09/06/2024 7,960 (H) Not Detected COPIES/mL Final           Highest viral load: 7,960    Risk factor for HIV include (check all that apply):  MSM    Past HIV associated illnesses: select all that apply: None    Past Medical / Surgical History  History reviewed. No pertinent past medical history.  History reviewed. No pertinent surgical history.  Family History:  Diabetes mellitus  (incomplete)  Social History     Socioeconomic History    Marital status: Single   Tobacco Use    Smoking status: Former     Types: Cigarettes    Smokeless tobacco: Never   Substance and Sexual Activity    Alcohol use: Not Currently    Drug use: Yes     Types: Other     Comment: edibles    Sexual activity: Not Currently     Social Drivers of Health      Food Insecurity: No Food Insecurity (10/14/2024)    Hunger Vital Sign     Worried About Running Out of Food in the Last Year: Never true     Ran Out of Food in the Last Year: Never true       Past Employment:   Previous Incarcerations: No  Pets: N/A  Current living situation: Home.     No Known Allergies    Immunizations:  Immunization History   Administered Date(s) Administered    Flu vaccine, trivalent, preservative free, age 6 months and greater (Fluarix/Fluzone/Flulaval) 10/24/2024    Hep A / Hep B 10/24/2024    Pfizer Purple Cap SARS-CoV-2 06/29/2021, 07/20/2021    Pneumococcal conjugate vaccine, 20-valent (PREVNAR 20) 10/24/2024       Past Medications taken for HIV include: None, rapid start on Biktarvy, started 9/10/24    CURRENT MEDICATIONS:    Current Outpatient Medications:     amLODIPine (Norvasc) 5 mg tablet, Take 1 tablet (5 mg) by mouth once daily., Disp: 30 tablet, Rfl: 5    zygqnllym-vxhyokog-lrkrcqm ala (Biktarvy) -25 mg tablet, Take 1 tablet by mouth once daily., Disp: 30 tablet, Rfl: 1    cholecalciferol (Vitamin D-3) 25 MCG (1000 UT) tablet, Take one tablet daily, Disp: 90 tablet, Rfl: 1    escitalopram (Lexapro) 10 mg tablet, Take 1 tablet (10 mg) by mouth once daily., Disp: 90 tablet, Rfl: 1    hydroCHLOROthiazide (HYDRODiuril) 25 mg tablet, Take 1 tablet (25 mg) by mouth once daily., Disp: 30 tablet, Rfl: 5    lisinopril 40 mg tablet, Take 1 tablet (40 mg) by mouth once daily., Disp: 30 tablet, Rfl: 5    meloxicam (Mobic) 15 mg tablet, Take 1 tablet (15 mg) by mouth once daily. Take with food., Disp: 90 tablet, Rfl: 0    OTC Meds or herbal supplements taken: None    Review of Symptoms  Review of Systems   Constitutional: Negative.    HENT: Negative.     Eyes: Negative.    Respiratory: Negative.     Cardiovascular: Negative.    Gastrointestinal: Negative.    Genitourinary:  Negative for difficulty urinating and dysuria.   Musculoskeletal: Negative.    Neurological: Negative.     Psychiatric/Behavioral:  The patient is nervous/anxious.    Anxiety has been controlled by escitalopram.       OBJECTIVE  Visit Vitals  /87   Pulse 103   Temp 35.9 °C (96.7 °F)   Resp 16   Wt 143 kg (315 lb 3.2 oz)   SpO2 97%   BMI 43.96 kg/m²   Smoking Status Former   BSA 2.68 m²       Physical Exam   Physical Exam  Constitutional:       Appearance: Normal appearance. He is obese.   HENT:      Head: Normocephalic.      Nose: Nose normal.      Mouth/Throat:      Mouth: Mucous membranes are moist.      Pharynx: Oropharynx is clear.   Eyes:      Extraocular Movements: Extraocular movements intact.      Conjunctiva/sclera: Conjunctivae normal.      Pupils: Pupils are equal, round, and reactive to light.   Cardiovascular:      Rate and Rhythm: Normal rate and regular rhythm.      Pulses: Normal pulses.      Heart sounds: Normal heart sounds.   Pulmonary:      Effort: Pulmonary effort is normal.      Breath sounds: Normal breath sounds.   Abdominal:      General: Bowel sounds are normal.      Palpations: Abdomen is soft.   Musculoskeletal:         General: Normal range of motion.      Cervical back: Normal range of motion and neck supple.   Skin:     General: Skin is warm.      Capillary Refill: Capillary refill takes less than 2 seconds.      Findings: Lesion present.      Comments: Acanthosis nigricans - posterior neck   Neurological:      General: No focal deficit present.      Mental Status: He is alert and oriented to person, place, and time. Mental status is at baseline.   Psychiatric:         Mood and Affect: Mood normal.         Thought Content: Thought content normal.           PERTINENT DATA:      CBC:  WBC   Date Value Ref Range Status   10/24/2024 12.1 (H) 4.4 - 11.3 x10*3/uL Final     nRBC   Date Value Ref Range Status   10/24/2024 0.0 0.0 - 0.0 /100 WBCs Final     RBC   Date Value Ref Range Status   10/24/2024 4.55 4.50 - 5.90 x10*6/uL Final     Hemoglobin   Date Value Ref Range Status   10/24/2024  14.1 13.5 - 17.5 g/dL Final     MCV   Date Value Ref Range Status   10/24/2024 92 80 - 100 fL Final     RDW   Date Value Ref Range Status   10/24/2024 13.6 11.5 - 14.5 % Final       Renal Function Panel:  Glucose   Date Value Ref Range Status   10/24/2024 95 74 - 99 mg/dL Final     Sodium   Date Value Ref Range Status   10/24/2024 141 136 - 145 mmol/L Final     Potassium   Date Value Ref Range Status   10/24/2024 4.4 3.5 - 5.3 mmol/L Final     Chloride   Date Value Ref Range Status   10/24/2024 103 98 - 107 mmol/L Final     Anion Gap   Date Value Ref Range Status   10/24/2024 14 10 - 20 mmol/L Final     Urea Nitrogen   Date Value Ref Range Status   10/24/2024 25 (H) 6 - 23 mg/dL Final     Creatinine   Date Value Ref Range Status   10/24/2024 1.95 (H) 0.50 - 1.30 mg/dL Final     eGFR   Date Value Ref Range Status   10/24/2024 45 (L) >60 mL/min/1.73m*2 Final     Comment:     Calculations of estimated GFR are performed using the 2021 CKD-EPI Study Refit equation without the race variable for the IDMS-Traceable creatinine methods.  https://jasn.asnjournals.org/content/early/2021/09/22/ASN.6511606431     Calcium   Date Value Ref Range Status   10/24/2024 10.5 8.6 - 10.6 mg/dL Final     Albumin   Date Value Ref Range Status   10/24/2024 4.9 3.4 - 5.0 g/dL Final       Hepatic Panel:  Albumin   Date Value Ref Range Status   10/24/2024 4.9 3.4 - 5.0 g/dL Final     Bilirubin, Total   Date Value Ref Range Status   10/24/2024 0.8 0.0 - 1.2 mg/dL Final     Alkaline Phosphatase   Date Value Ref Range Status   10/24/2024 57 33 - 120 U/L Final     ALT   Date Value Ref Range Status   10/24/2024 16 10 - 52 U/L Final     Comment:     Patients treated with Sulfasalazine may generate falsely decreased results for ALT.       HIV Viral Load:  HIV-1 RNA PCR Viral Load Log   Date Value Ref Range Status   09/06/2024 3.90 log10 cpy/mL Final     HIV RNA Result   Date Value Ref Range Status   09/06/2024 Detected (A) Not Detected Final       CD4  "Count:  CD3+CD4+%   Date Value Ref Range Status   09/06/2024 12 (L) 29 - 57 % Final     CD3+CD4+ Absolute   Date Value Ref Range Status   09/06/2024 0.335 (L) 0.350 - 2.740 x10E9/L Final     CD3+CD8+%   Date Value Ref Range Status   09/06/2024 70 (H) 7 - 31 % Final     CD3+CD8+ Absolute   Date Value Ref Range Status   09/06/2024 1.953 (H) 0.080 - 1.490 x10E9/L Final     CD4/CD8 Ratio   Date Value Ref Range Status   09/06/2024 0.17 (L) 1.00 - 2.60 Final       ASSESSMENT / PLAN:  34 y.o. year old male establishing care today. Initiated on Biktarvy as rapid start with Dr. Augustin.  Seeing me due to scheduling preference. His PCP is Dr. Ines Sheppard.    Problem List Items Addressed This Visit       Obesity    Current Assessment & Plan     HgbA1c will be collected today.  We discussed dietary basics and advised that he try to limit his juice intake as that seems to be his Eric's heel.  I will have our dietician reach out to him for dietary guidance as he is very motivated to try a \"new challenge\" and improve his health.         HIV infection - Primary    Overview     HIV history entered by HIV care provider: PLEASE DO NOT DELETE     Initial diagnosis:  8/26/24  CD4 jorge of 335 / 12% on 9/6/24.    Pertinent Screens:  HAV Ab:  Non-reactive 9/6/24  HBsAg:  Non-reactive 9/6/24  HBsAb:  Non-reactive 9/6/24  HBcAb:  Non-reactive 9/6/24  HCV Ab:  Non-reactive 9/6/24  CMV IgG:  Positive 9/6/24  Toxo IgG:  Non-reactive 9/6/24  G6PD:  Normal  9/6/24  HLA B57-01:  Negative  9/6/24  Syphilis IgG:  Non-reactive 9/6/24  PPD/IGRA:  Negative  9/6/24  HIV Genotype:  9/6/24  Major Mutation Hx: K103N    HAART Hx:  Biktarvy: 9/10/24 - current    HIV associated illnesses/Nicole:  None           Current Assessment & Plan     He has completed 6 weeks of Biktarvy after rapid start.  Labs will be collected today: CBC w/ diff, Hepatic panel, renal panel, CD4 and HIV RNA PCR to assess response to therapy  Vaccines updated with Flu, Prevnar 20, " and Twinrix #1 today.  Orders for full Twinrix protocol ordered today.  Will RTC in 3 months           Relevant Orders    CBC and Auto Differential (Completed)    CD4/8 Panel    HIV RNA, quantitative, PCR    Comprehensive Metabolic Panel (Completed)    Hepatitis A hepatitis B combined vaccine IM (Completed)    Hepatitis A hepatitis B combined vaccine IM    Hepatitis A hepatitis B combined vaccine IM    Flu vaccine, trivalent, preservative free, age 6 months and greater (Fluarix/Fluzone/Flulaval) (Completed)    Pneumococcal conjugate vaccine, 20-valent (PREVNAR 20) (Completed)       Elida Matta MD

## 2024-10-24 NOTE — LETTER
10/24/24    Ines Sheppard MD  [address]  [facility] [address]      Dear Dr. Ines Sheppard MD,    I am writing to confirm that your patient, Sukhdev Manzano, received care in my office on 10/24/24. I have enclosed a summary of the care provided to Sukhdev for your reference.    Please contact me with any questions you may have regarding the visit.    Sincerely,         Elida Matta MD  [address] [facility]  [address]  [facility] [id number] [phone]    CC: No Recipients

## 2024-10-24 NOTE — PATIENT INSTRUCTIONS
It was great to see you today!  Your last blood work that we did in clinic showed that your T-cells (CD4 count) was 335 / 12%.  Your virus was fully suppressed at less than 20 copies.  Keep up the great work taking your medications.  We collected routine blood work today.  I will see you back in clinic in XXX months.  If any issues should arise before then, please remember to call the clinic and get in contact with your nurse.

## 2024-10-25 LAB
CD3+CD4+ CELLS # BLD: 0.45 X10E9/L
CD3+CD4+ CELLS # BLD: 452 /MM3
CD3+CD4+ CELLS NFR BLD: 11 %
CD3+CD4+ CELLS/CD3+CD8+ CLL BLD: 0.15 %
CD3+CD8+ CELLS # BLD: 3.08 X10E9/L
CD3+CD8+ CELLS NFR BLD: 75 %
HIV1 RNA # PLAS NAA DL=20: 27 COPIES/ML
HIV1 RNA # PLAS NAA DL=20: DETECTED {COPIES}/ML
HIV1 RNA SPEC NAA+PROBE-LOG#: 1.43 LOG10 CPY/ML
LYMPHOCYTES # SPEC AUTO: 4.11 X10*3/UL

## 2024-10-25 NOTE — ASSESSMENT & PLAN NOTE
He has completed 6 weeks of Biktarvy after rapid start.  Labs will be collected today: CBC w/ diff, Hepatic panel, renal panel, CD4 and HIV RNA PCR to assess response to therapy  Vaccines updated with Flu, Prevnar 20, and Twinrix #1 today.  Orders for full Twinrix protocol ordered today.  Will RTC in 3 months

## 2024-10-25 NOTE — PROGRESS NOTES
Time spent: 15 mins  EIS: Newly Diagnosed    Sw meets with Pt at [facility] with Dr. Matta. Pt reports that he is doing well, endorses total adherence to ART since rapid start on 9/9. Pt is agreeable to case mgmt and Sw will call him to complete ISP/PSA as he has already been at IVET for a long time today. Pt denies any significant concerns since RN intake. Successfully filled ART for the second time w/out issue. Pt will to contact Sw if any issues or barriers come up. Sw will call Pt next week to enroll Pt in CM. Sw will follow PRN.

## 2024-10-25 NOTE — ASSESSMENT & PLAN NOTE
"HgbA1c will be collected today.  We discussed dietary basics and advised that he try to limit his juice intake as that seems to be his Ponce's heel.  I will have our dietician reach out to him for dietary guidance as he is very motivated to try a \"new challenge\" and improve his health.  "

## 2024-11-06 DIAGNOSIS — B20 HIV DISEASE (MULTI): ICD-10-CM

## 2024-11-06 RX ORDER — BICTEGRAVIR SODIUM, EMTRICITABINE, AND TENOFOVIR ALAFENAMIDE FUMARATE 50; 200; 25 MG/1; MG/1; MG/1
1 TABLET ORAL DAILY
Qty: 30 TABLET | Refills: 1 | Status: SHIPPED | OUTPATIENT
Start: 2024-11-06

## 2025-01-09 DIAGNOSIS — B20 HIV DISEASE (MULTI): ICD-10-CM

## 2025-01-09 RX ORDER — BICTEGRAVIR SODIUM, EMTRICITABINE, AND TENOFOVIR ALAFENAMIDE FUMARATE 50; 200; 25 MG/1; MG/1; MG/1
1 TABLET ORAL DAILY
Qty: 30 TABLET | Refills: 1 | Status: SHIPPED | OUTPATIENT
Start: 2025-01-09

## 2025-01-10 ENCOUNTER — TELEPHONE (OUTPATIENT)
Dept: IMMUNOLOGY | Facility: CLINIC | Age: 35
End: 2025-01-10
Payer: COMMERCIAL

## 2025-01-10 NOTE — TELEPHONE ENCOUNTER
Time spent: 20 mins  EIS: Newly Dx    Pt texts Swk cell with copay issue - concerned he doesn't have OHDAP any more. Sw checks RWAD - Pt's OHDAP remains active through 2/28. Pt denies any changes to insurance coverage in new year. Sw attempts to contact [facility] to verify they are billing OHDAP properly - no answer, Sw transferred to provider  repeatedly. Sw provides Pt with their email address to send his current pay stubs for upcoming OHDAP renewal. Sw advises that they are sending OHDAP billing information that he can provide to pharmacy. Pt endorses having 1 dose of ART remaining. Sw encourages Pt that if he had to go one day over weekend without meds he would be okay - Sw will address on 1/13 if Pt still having issues. Pt will continue to contact Sw if any issues or barriers come up. Sw will continue to follow PRN.      From: Celia Ho <[email or web address]>  Sent: Friday, January 10, 2025 4:16 PM  To: Sukhdev Manzano <[email or web address]  Subject: Re: pay stubs     Antonio Santizo,    Thank you for sending these. I'll have you sign the forms for your OHDAP update when you're here on the 20th. I've attached the OHDAP billing information that you can provide the pharmacy - please let me know ASAP if you have any issues with this! If for some reason you had to go Sunday without your meds, that would be okay (I'm hoping that is NOT the case).     Talk to you soon,      Celia “BRUCE” GÉNESIS Ho  Pronouns: they/them (what is this?)  P: [phone]  F: [phone]        From: Sukhdev Manzano <[email or web address]  Sent: Friday, January 10, 2025 3:52 PM  To: Celia Ho <[email or web address]>  Subject:  pay stubs        Hi BRUCE,     Attached are my 2 most recent pay stubs      Thanks,       Sukhdev Manzano   - [employer]  [address], [place] [address]  Email: [email or web address]

## 2025-01-17 NOTE — TELEPHONE ENCOUNTER
Time Spent: 5 mins   EIS reached out to pt for EST f/U. Pt available to answer call.  Pt confirmed EST f/U   EIS attempted to screen for present barriers before pt phone disconnected.     PLAN:   EIS will complete EIS Plan   EIS will f/U with financial assistance progress   EIS will f/U to retain in care

## 2025-01-20 ENCOUNTER — DOCUMENTATION (OUTPATIENT)
Dept: IMMUNOLOGY | Facility: CLINIC | Age: 35
End: 2025-01-20
Payer: COMMERCIAL

## 2025-01-20 ENCOUNTER — OFFICE VISIT (OUTPATIENT)
Dept: IMMUNOLOGY | Facility: CLINIC | Age: 35
End: 2025-01-20
Payer: COMMERCIAL

## 2025-01-20 ENCOUNTER — DOCUMENTATION (OUTPATIENT)
Dept: IMMUNOLOGY | Facility: CLINIC | Age: 35
End: 2025-01-20

## 2025-01-20 VITALS
OXYGEN SATURATION: 96 % | RESPIRATION RATE: 16 BRPM | TEMPERATURE: 97.8 F | BODY MASS INDEX: 45.33 KG/M2 | SYSTOLIC BLOOD PRESSURE: 113 MMHG | DIASTOLIC BLOOD PRESSURE: 71 MMHG | HEART RATE: 99 BPM | WEIGHT: 315 LBS

## 2025-01-20 DIAGNOSIS — Z21 HIV INFECTION, UNSPECIFIED SYMPTOM STATUS: Primary | ICD-10-CM

## 2025-01-20 DIAGNOSIS — E66.01 CLASS 3 SEVERE OBESITY DUE TO EXCESS CALORIES WITH BODY MASS INDEX (BMI) OF 45.0 TO 49.9 IN ADULT, UNSPECIFIED WHETHER SERIOUS COMORBIDITY PRESENT: ICD-10-CM

## 2025-01-20 DIAGNOSIS — R73.09 ELEVATED HEMOGLOBIN A1C: ICD-10-CM

## 2025-01-20 DIAGNOSIS — Z11.3 SCREENING FOR STD (SEXUALLY TRANSMITTED DISEASE): ICD-10-CM

## 2025-01-20 DIAGNOSIS — Z79.899 HIGH RISK MEDICATIONS (NOT ANTICOAGULANTS) LONG-TERM USE: ICD-10-CM

## 2025-01-20 DIAGNOSIS — Z71.84 COUNSELING FOR TRAVEL: ICD-10-CM

## 2025-01-20 DIAGNOSIS — E66.813 CLASS 3 SEVERE OBESITY DUE TO EXCESS CALORIES WITH BODY MASS INDEX (BMI) OF 45.0 TO 49.9 IN ADULT, UNSPECIFIED WHETHER SERIOUS COMORBIDITY PRESENT: ICD-10-CM

## 2025-01-20 LAB
BASOPHILS # BLD AUTO: 0.06 X10*3/UL (ref 0–0.1)
BASOPHILS NFR BLD AUTO: 0.6 %
EOSINOPHIL # BLD AUTO: 0.27 X10*3/UL (ref 0–0.7)
EOSINOPHIL NFR BLD AUTO: 2.8 %
ERYTHROCYTE [DISTWIDTH] IN BLOOD BY AUTOMATED COUNT: 12.5 % (ref 11.5–14.5)
EST. AVERAGE GLUCOSE BLD GHB EST-MCNC: 117 MG/DL
HBA1C MFR BLD: 5.7 %
HCT VFR BLD AUTO: 44.4 % (ref 41–52)
HGB BLD-MCNC: 14.4 G/DL (ref 13.5–17.5)
IMM GRANULOCYTES # BLD AUTO: 0.04 X10*3/UL (ref 0–0.7)
IMM GRANULOCYTES NFR BLD AUTO: 0.4 % (ref 0–0.9)
LYMPHOCYTES # BLD AUTO: 2.68 X10*3/UL (ref 1.2–4.8)
LYMPHOCYTES NFR BLD AUTO: 27.8 %
MCH RBC QN AUTO: 31.9 PG (ref 26–34)
MCHC RBC AUTO-ENTMCNC: 32.4 G/DL (ref 32–36)
MCV RBC AUTO: 98 FL (ref 80–100)
MONOCYTES # BLD AUTO: 0.5 X10*3/UL (ref 0.1–1)
MONOCYTES NFR BLD AUTO: 5.2 %
NEUTROPHILS # BLD AUTO: 6.08 X10*3/UL (ref 1.2–7.7)
NEUTROPHILS NFR BLD AUTO: 63.2 %
NRBC BLD-RTO: 0 /100 WBCS (ref 0–0)
PLATELET # BLD AUTO: 227 X10*3/UL (ref 150–450)
RBC # BLD AUTO: 4.51 X10*6/UL (ref 4.5–5.9)
TREPONEMA PALLIDUM IGG+IGM AB [PRESENCE] IN SERUM OR PLASMA BY IMMUNOASSAY: NONREACTIVE
WBC # BLD AUTO: 9.6 X10*3/UL (ref 4.4–11.3)

## 2025-01-20 PROCEDURE — 82247 BILIRUBIN TOTAL: CPT | Performed by: INTERNAL MEDICINE

## 2025-01-20 PROCEDURE — 87491 CHLMYD TRACH DNA AMP PROBE: CPT | Performed by: INTERNAL MEDICINE

## 2025-01-20 PROCEDURE — 3078F DIAST BP <80 MM HG: CPT | Performed by: INTERNAL MEDICINE

## 2025-01-20 PROCEDURE — 90715 TDAP VACCINE 7 YRS/> IM: CPT | Performed by: INTERNAL MEDICINE

## 2025-01-20 PROCEDURE — 87536 HIV-1 QUANT&REVRSE TRNSCRPJ: CPT | Performed by: INTERNAL MEDICINE

## 2025-01-20 PROCEDURE — 3074F SYST BP LT 130 MM HG: CPT | Performed by: INTERNAL MEDICINE

## 2025-01-20 PROCEDURE — 90636 HEP A/HEP B VACC ADULT IM: CPT | Performed by: INTERNAL MEDICINE

## 2025-01-20 PROCEDURE — 84100 ASSAY OF PHOSPHORUS: CPT | Performed by: INTERNAL MEDICINE

## 2025-01-20 PROCEDURE — 99215 OFFICE O/P EST HI 40 MIN: CPT | Performed by: INTERNAL MEDICINE

## 2025-01-20 PROCEDURE — 99215 OFFICE O/P EST HI 40 MIN: CPT | Mod: 25 | Performed by: INTERNAL MEDICINE

## 2025-01-20 PROCEDURE — 36415 COLL VENOUS BLD VENIPUNCTURE: CPT | Performed by: INTERNAL MEDICINE

## 2025-01-20 PROCEDURE — 85025 COMPLETE CBC W/AUTO DIFF WBC: CPT | Performed by: INTERNAL MEDICINE

## 2025-01-20 PROCEDURE — 99417 PROLNG OP E/M EACH 15 MIN: CPT | Performed by: INTERNAL MEDICINE

## 2025-01-20 PROCEDURE — 88185 FLOWCYTOMETRY/TC ADD-ON: CPT | Performed by: INTERNAL MEDICINE

## 2025-01-20 PROCEDURE — 83036 HEMOGLOBIN GLYCOSYLATED A1C: CPT | Performed by: INTERNAL MEDICINE

## 2025-01-20 PROCEDURE — 86780 TREPONEMA PALLIDUM: CPT | Performed by: INTERNAL MEDICINE

## 2025-01-20 ASSESSMENT — PAIN SCALES - GENERAL: PAINLEVEL_OUTOF10: 0-NO PAIN

## 2025-01-20 NOTE — PATIENT INSTRUCTIONS
It was great to see you today!  Your last blood work that we did in clinic showed that your T-cells (CD4 count) [id number] v/ 11%.  Your virus was nearly fully suppressed at  27 copies. Undetectable is any value <20 copies Keep up the great work taking your medications.  We collected routine blood work today.  I will see you back in clinic in 6months.  If any issues should arise before then, please remember to call the clinic and get in contact with your nurse.    Please check with your dental coverage and make an appointment with a dentist to start routine a dental program.  This is important for your overall long term health as well    For your information, with the exception of blood work collected in the IVET, any outpatient lab studies ordered by other physicians or specialists in the  system will be performed by [facility].  Your insurance MAY not cover those tests if they are not contracted with [facility].  If you see other care providers please reach out to your insurance providers to verify that it will cover your blood work through [facility].  If possible, we can collect the blood work at your visits here as, often, they are labs that we collect anyway.

## 2025-01-20 NOTE — PROGRESS NOTES
Jenniffer meets with Pt at MD visit. Sw and Pt discuss OHDAP updates due 2/28. Pt sends pay stubs and he is now over income guidelines for [employer]. Jenniffer assists Pt with accessing [facility] copay card for his ART, explains that Pt's other Rx copays will now be his responsibility - Pt states understanding. Jenniffer encourages Pt to keep them updated if he has any issues using copay card. As Pt is over income for [employer], no enrollment in case management today.  Pt will continue to contact Jenniffer if any issues or barriers come up. Jenniffer will continue to follow and assist Pt PRN to reduce barriers to ongoing adherence and best outcomes.

## 2025-01-20 NOTE — PROGRESS NOTES
Time Spent: 15 mins   EARLY INTERVENTION ENCOUNTER PLAN    EIS Encounter Definition:    [x] New Diagnosis [] Referral  [] Receiving HIV/AIDS Services without PCP [] Returning to Care  [] Unaware of HIV Status  [] Retaining in Care      Does the patient understand the patient grievance procedures? ([x]Y or []N)  Speak Up - About your care information provided and reviewed with patient? ([x]Y or []N)  Is the patient aware who to call in the IVET when they have questions? ([x]Y or []N)      Gauge patients understanding on the following: (Scale: 1=No knowledge - 5=Very knowledgeable)  How is HIV transmitted?  [] 1 [] 2 [] 3 [] 4 [x] 5  U=U (Undetectable = Untransmittable)  [] 1 [] 2 [] 3 [] 4 [x] 5  ART/ARV (Antiretroviral Medication)  [] 1 [] 2 [] 3 [] 4 [x] 5  Viral loads (Amount of virus)  [] 1 [] 2 [] 3 [] 4 [x] 5  CD4 (White blood cells)  [] 1 [] 2 [] 3 [] 4 [x] 5    EIS will f/U with pt on any topics scored below 5.     Second Visit:  Has the patient missed any appointments? ([]Y or [x]N)  If yes, please discuss any barriers with the patient that may have been an issue:  Is the patient happy with the care they are receiving so far? ([x]Y or []N)    What actions has the  taken to assist patient in maintaining 1 year goal?    EIS NOTES:   EIS met with pt during EST f/U   Pt identified optimal medication adherence, no missed days within last 30 days   Pt f/U with SW (BRUCE Ho) to reduce financial barrier to medication access   Pt will f/U with mortgage assistance resources as needed after starting new employment opportunity     EIS PLAN:   EIS will continue EIS Plan   EIS will f/U to retain in care     Name: Kristal Eduardo  [phone]  [email or web address]

## 2025-01-20 NOTE — LETTER
01/21/25    Ines Sheppard MD  [address]  [facility] [address]      Dear Dr. Ines Sheppard MD,    I am writing to confirm that your patient, Sukhdev Manzano, received care in my office on 01/21/25. I have enclosed a summary of the care provided to Sukhdev for your reference.    Please contact me with any questions you may have regarding the visit.    Sincerely,         Elida Matta MD  [address] [facility]  [address]  [facility] [id number] [phone]    CC: No Recipients

## 2025-01-20 NOTE — PROGRESS NOTES
"HIV Clinic Follow-up Visit:    Sukhdev Manzano was last seen in [facility] on 1/10/2025    Missed antiretroviral doses in last 72 hours? No    Sexually active? yes, Partner/s aware of diagnosis? yes,     Condom use?  mostly , Partner on PrEP? Yes  U=U: discussed and aware.  Tobacco use: No , uses MJ daily  Alcohol:  Socially  Last dental visit: \"as a kid\"    SUBJECTIVE:   Here for follow-up.  Nearly undetectable at last testing.  Missed a couple doses of meds due to forgetting in AM.  Decided to hold until following day when discovered in evening after coming home.    Sexually active with 4 partners since last seen.  All aware of dx.  All on PREP.  Inconsistent condom use. Mostly oral sex since last visit. No sore throat, penile discharge and anal pain or sores.    Has not seen dentist since a chills.  No pain in teeth.  Has noted white lines on side of tongue that slightly improve when brushes tongue with tooth brush. No mouth pain or sensitivity.    Review of Systems  Full 10 point ROS performed.  All pertinent positives and negatives as documented in HPI.     CURRENT MEDICATIONS:    Current Outpatient Medications:     amLODIPine (Norvasc) 5 mg tablet, Take 1 tablet (5 mg) by mouth once daily., Disp: 30 tablet, Rfl: 5    Biktarvy -25 mg tablet, TAKE 1 TABLET BY MOUTH EVERY DAY, Disp: 30 tablet, Rfl: 1    escitalopram (Lexapro) 10 mg tablet, Take 1 tablet (10 mg) by mouth once daily., Disp: 90 tablet, Rfl: 1    hydroCHLOROthiazide (HYDRODiuril) 25 mg tablet, Take 1 tablet (25 mg) by mouth once daily., Disp: 30 tablet, Rfl: 5    lisinopril 40 mg tablet, Take 1 tablet (40 mg) by mouth once daily., Disp: 30 tablet, Rfl: 5    cholecalciferol (Vitamin D-3) 25 MCG (1000 UT) tablet, Take one tablet daily (Patient not taking: Reported on 1/20/2025), Disp: 90 tablet, Rfl: 1    meloxicam (Mobic) 15 mg tablet, Take 1 tablet (15 mg) by mouth once daily. Take with food. (Patient not taking: Reported on 1/20/2025), Disp: 90 " tablet, Rfl: 0    PHYSICAL EXAMINATION:  Visit Vitals  /71   Pulse 99   Temp 36.6 °C (97.8 °F)   Resp 16   Wt 147 kg (325 lb)   SpO2 96%   BMI 45.33 kg/m²   Smoking Status Former   BSA 2.71 m²       Physical Exam   Physical Exam  Vitals reviewed.   Constitutional:       General: He is not in acute distress.     Appearance: Normal appearance. He is obese. He is not toxic-appearing.   HENT:      Head: Normocephalic and atraumatic.      Mouth/Throat:      Mouth: Mucous membranes are moist.      Pharynx: Oropharynx is clear.      Comments: Lateral tongue with faint white tissue discoloration located at the pressure points of where tongue meets teeth   Eyes:      Extraocular Movements: Extraocular movements intact.      Conjunctiva/sclera: Conjunctivae normal.      Pupils: Pupils are equal, round, and reactive to light.   Cardiovascular:      Rate and Rhythm: Normal rate and regular rhythm.      Heart sounds: Normal heart sounds.   Pulmonary:      Effort: Pulmonary effort is normal.      Breath sounds: Normal breath sounds.   Abdominal:      General: Abdomen is flat. Bowel sounds are normal. There is no distension.      Palpations: Abdomen is soft.      Tenderness: There is no guarding or rebound.   Musculoskeletal:      Cervical back: Normal range of motion and neck supple.   Neurological:      General: No focal deficit present.      Mental Status: He is alert and oriented to person, place, and time.      Cranial Nerves: No cranial nerve deficit.      Deep Tendon Reflexes: Reflexes normal (symmetric, brisk).         PERTINENT DATA:  CBC:  WBC   Date Value Ref Range Status   01/20/2025 9.6 4.4 - 11.3 x10*3/uL Final     nRBC   Date Value Ref Range Status   01/20/2025 0.0 0.0 - 0.0 /100 WBCs Final     RBC   Date Value Ref Range Status   01/20/2025 4.51 4.50 - 5.90 x10*6/uL Final     Hemoglobin   Date Value Ref Range Status   01/20/2025 14.4 13.5 - 17.5 g/dL Final     MCV   Date Value Ref Range Status   01/20/2025 98  80 - 100 fL Final     RDW   Date Value Ref Range Status   01/20/2025 12.5 11.5 - 14.5 % Final       Renal Function Panel:  Glucose   Date Value Ref Range Status   10/24/2024 95 74 - 99 mg/dL Final     Sodium   Date Value Ref Range Status   10/24/2024 141 136 - 145 mmol/L Final     Potassium   Date Value Ref Range Status   10/24/2024 4.4 3.5 - 5.3 mmol/L Final     Chloride   Date Value Ref Range Status   10/24/2024 103 98 - 107 mmol/L Final     Anion Gap   Date Value Ref Range Status   10/24/2024 14 10 - 20 mmol/L Final     Urea Nitrogen   Date Value Ref Range Status   10/24/2024 25 (H) 6 - 23 mg/dL Final     Creatinine   Date Value Ref Range Status   10/24/2024 1.95 (H) 0.50 - 1.30 mg/dL Final     eGFR   Date Value Ref Range Status   10/24/2024 45 (L) >60 mL/min/1.73m*2 Final     Comment:     Calculations of estimated GFR are performed using the 2021 CKD-EPI Study Refit equation without the race variable for the IDMS-Traceable creatinine methods.  https://jasn.asnjournals.org/content/early/2021/09/22/ASN.8775626242     Calcium   Date Value Ref Range Status   10/24/2024 10.5 8.6 - 10.6 mg/dL Final     Albumin   Date Value Ref Range Status   10/24/2024 4.9 3.4 - 5.0 g/dL Final       Hepatic Panel:  Albumin   Date Value Ref Range Status   10/24/2024 4.9 3.4 - 5.0 g/dL Final     Bilirubin, Total   Date Value Ref Range Status   10/24/2024 0.8 0.0 - 1.2 mg/dL Final     Alkaline Phosphatase   Date Value Ref Range Status   10/24/2024 57 33 - 120 U/L Final     ALT   Date Value Ref Range Status   10/24/2024 16 10 - 52 U/L Final     Comment:     Patients treated with Sulfasalazine may generate falsely decreased results for ALT.       HIV Viral Load:  HIV-1 RNA PCR Viral Load Log   Date Value Ref Range Status   10/24/2024 1.43 log10 cpy/mL Final     HIV RNA Result   Date Value Ref Range Status   10/24/2024 Detected (A) Not Detected Final       CD4 Count:  CD3+CD4+%   Date Value Ref Range Status   10/24/2024 11 (L) 29 - 57 %  "Final     CD3+CD4+ Absolute   Date Value Ref Range Status   10/24/2024 0.452 0.350 - 2.740 x10E9/L Final     CD3+CD8+%   Date Value Ref Range Status   10/24/2024 75 (H) 7 - 31 % Final     CD3+CD8+ Absolute   Date Value Ref Range Status   10/24/2024 3.083 (H) 0.080 - 1.490 x10E9/L Final     CD4/CD8 Ratio   Date Value Ref Range Status   10/24/2024 0.15 (L) 1.00 - 2.60 Final       CRCL:  No results found for: \"URINEVOLUME\", \"CREATU\", \"[id number]\", \"CRCLEARANCE\"    Lipid Panel:  HDL-Cholesterol   Date Value Ref Range Status   08/26/2024 45.0 mg/dL Final     Comment:       Age       Very Low   Low     Normal    High    0-19 Y    < 35      < 40     40-45     ----  20-24 Y    ----     < 40      >45      ----        >24 Y      ----     < 40     40-60      >60       Cholesterol/HDL Ratio   Date Value Ref Range Status   08/26/2024 5.7  Final     Comment:       Ref Values  Desirable  < 3.4  High Risk  > 5.0     LDL   Date Value Ref Range Status   08/24/2022 105 (H) 0 - 99 mg/dL Final     Comment:     .                           NEAR      BORD      AGE      DESIRABLE  OPTIMAL    HIGH     HIGH     VERY HIGH     0-19 Y     0 - 109     ---    110-129   >/= 130     ----    20-24 Y     0 - 119     ---    120-159   >/= 160     ----      >24 Y     0 -  99   100-129  130-159   160-189     >/=190  .       VLDL   Date Value Ref Range Status   08/26/2024 28 0 - 40 mg/dL Final     Triglycerides   Date Value Ref Range Status   08/26/2024 139 0 - 149 mg/dL Final     Comment:        Age         Desirable   Borderline High   High     Very High   0 D-90 D    19 - 174         ----         ----        ----  91 D- 9 Y     0 -  74        75 -  99     >/= 100      ----    10-19 Y     0 -  89        90 - 129     >/= 130      ----    20-24 Y     0 - 114       115 - 149     >/= 150      ----         >24 Y     0 - 149       150 - 199    200- 499    >/= 500    Venipuncture immediately after or during the administration of Metamizole may lead to falsely " low results. Testing should be performed immediately prior to Metamizole dosing.       HgbA1c:  Hemoglobin A1C   Date Value Ref Range Status   01/20/2025 5.7 (H) See comment % Final       The ASCVD Risk score (Eb SHAW, et al., 2019) failed to calculate for the following reasons:    The 2019 ASCVD risk score is only valid for ages 40 to 79    ASSESSMENT / PLAN:    Problem List Items Addressed This Visit       Obesity    Current Assessment & Plan     Gaining wait.  Possible HAART related  Clay check HgbA1c today as elevated at last testing.  Dietician to reach out to patient to discuss diet.  Patient requested phone follow-up.         HIV infection - Primary    Overview     HIV history entered by HIV care provider: PLEASE DO NOT DELETE     Initial diagnosis:  8/26/24  CD4 jorge of 335 / 12% on 9/6/24.    Pertinent Screens:  HAV Ab:  Non-reactive 9/6/24  HBsAg:  Non-reactive 9/6/24  HBsAb:  Non-reactive 9/6/24  HBcAb:  Non-reactive 9/6/24  HCV Ab:  Non-reactive 9/6/24  CMV IgG:  Positive 9/6/24  Toxo IgG:  Non-reactive 9/6/24  G6PD:  Normal  9/6/24  HLA B57-01:  Negative  9/6/24  Syphilis IgG:  Non-reactive 9/6/24  PPD/IGRA:  Negative  9/6/24  HIV Genotype:  9/6/24  Major Mutation Hx: K103N    HAART Hx:  Biktarvy: 9/10/24 - current    HIV associated illnesses/Nicole:  None           Current Assessment & Plan     Immunologically intact and virologically suppressed on current anti-retroviral agents.  Will continue Biktarvy.  Routine labs will be collected today: CBC w/ diff, CMP, phosphorus, CD4 and HIV RNA PCR.  Vaccines updated with Twinrix #2 and Tdap.  Dose # 3 in March 2025.  STD screening performed.  Will RTC in 6 months           Relevant Orders    CBC and Auto Differential (Completed)    CD4/8 Panel    HIV RNA, quantitative, PCR    Hepatitis A hepatitis B combined vaccine IM (Completed)    Tdap vaccine, age 7 years and older  (BOOSTRIX) (Completed)     Other Visit Diagnoses       High risk medications (not  anticoagulants) long-term use        Relevant Orders    Comprehensive Metabolic Panel    Phosphorus    Screening for STD (sexually transmitted disease)        Relevant Orders    Syphilis Screen with Reflex (Completed)    C. trachomatis / N. gonorrhoeae, Amplified    C. trachomatis / N. gonorrhoeae, Amplified    Counseling for travel        Relevant Orders    Hepatitis A hepatitis B combined vaccine IM (Completed)    Elevated hemoglobin A1c        Relevant Orders    Hemoglobin A1c (Completed)            Elida Matta MD

## 2025-01-21 LAB
C TRACH RRNA SPEC QL NAA+PROBE: NEGATIVE
C TRACH RRNA SPEC QL NAA+PROBE: NEGATIVE
CD3+CD4+ CELLS # BLD: 0.4 X10E9/L
CD3+CD4+ CELLS # BLD: 402 /MM3
CD3+CD4+ CELLS NFR BLD: 15 %
CD3+CD4+ CELLS/CD3+CD8+ CLL BLD: 0.21 %
CD3+CD8+ CELLS # BLD: 1.88 X10E9/L
CD3+CD8+ CELLS NFR BLD: 70 %
LYMPHOCYTES # SPEC AUTO: 2.68 X10*3/UL
N GONORRHOEA DNA SPEC QL PROBE+SIG AMP: NEGATIVE
N GONORRHOEA DNA SPEC QL PROBE+SIG AMP: NEGATIVE

## 2025-01-21 NOTE — ASSESSMENT & PLAN NOTE
Immunologically intact and virologically suppressed on current anti-retroviral agents.  Will continue Biktarvy.  Routine labs will be collected today: CBC w/ diff, CMP, phosphorus, CD4 and HIV RNA PCR.  Vaccines updated with Twinrix #2 and Tdap.  Dose # 3 in March 2025.  STD screening performed.  Will RTC in 6 months

## 2025-01-21 NOTE — ASSESSMENT & PLAN NOTE
Gaining wait.  Possible HAART related  Clay check HgbA1c today as elevated at last testing.  Dietician to reach out to patient to discuss diet.  Patient requested phone follow-up.

## 2025-01-22 ENCOUNTER — TELEPHONE (OUTPATIENT)
Dept: IMMUNOLOGY | Facility: CLINIC | Age: 35
End: 2025-01-22
Payer: COMMERCIAL

## 2025-01-22 LAB
ALBUMIN SERPL BCP-MCNC: 5 G/DL (ref 3.4–5)
ALP SERPL-CCNC: 52 U/L (ref 33–120)
ALT SERPL W P-5'-P-CCNC: 11 U/L (ref 10–52)
ANION GAP SERPL CALC-SCNC: 23 MMOL/L (ref 10–20)
AST SERPL W P-5'-P-CCNC: 15 U/L (ref 9–39)
BILIRUB SERPL-MCNC: 0.5 MG/DL (ref 0–1.2)
BUN SERPL-MCNC: 29 MG/DL (ref 6–23)
CALCIUM SERPL-MCNC: 10.2 MG/DL (ref 8.6–10.6)
CHLORIDE SERPL-SCNC: 106 MMOL/L (ref 98–107)
CO2 SERPL-SCNC: 21 MMOL/L (ref 21–32)
CREAT SERPL-MCNC: 2.11 MG/DL (ref 0.5–1.3)
EGFRCR SERPLBLD CKD-EPI 2021: 41 ML/MIN/1.73M*2
GLUCOSE SERPL-MCNC: 86 MG/DL (ref 74–99)
HIV1 RNA # PLAS NAA DL=20: NOT DETECTED {COPIES}/ML
HIV1 RNA SPEC NAA+PROBE-LOG#: NORMAL {LOG_COPIES}/ML
PHOSPHATE SERPL-MCNC: 3.1 MG/DL (ref 2.5–4.9)
POTASSIUM SERPL-SCNC: 5 MMOL/L (ref 3.5–5.3)
PROT SERPL-MCNC: 8.3 G/DL (ref 6.4–8.2)
SODIUM SERPL-SCNC: 145 MMOL/L (ref 136–145)

## 2025-02-27 ENCOUNTER — APPOINTMENT (OUTPATIENT)
Dept: IMMUNOLOGY | Facility: CLINIC | Age: 35
End: 2025-02-27
Payer: COMMERCIAL

## 2025-03-03 ENCOUNTER — APPOINTMENT (OUTPATIENT)
Dept: IMMUNOLOGY | Facility: CLINIC | Age: 35
End: 2025-03-03
Payer: COMMERCIAL

## 2025-03-10 DIAGNOSIS — I10 PRIMARY HYPERTENSION: ICD-10-CM

## 2025-03-10 DIAGNOSIS — B20 HIV DISEASE (MULTI): ICD-10-CM

## 2025-03-10 RX ORDER — BICTEGRAVIR SODIUM, EMTRICITABINE, AND TENOFOVIR ALAFENAMIDE FUMARATE 50; 200; 25 MG/1; MG/1; MG/1
1 TABLET ORAL DAILY
Qty: 30 TABLET | Refills: 5 | Status: SHIPPED | OUTPATIENT
Start: 2025-03-10

## 2025-03-10 RX ORDER — HYDROCHLOROTHIAZIDE 25 MG/1
25 TABLET ORAL DAILY
Qty: 30 TABLET | Refills: 5 | OUTPATIENT
Start: 2025-03-10

## 2025-03-10 RX ORDER — LISINOPRIL 40 MG/1
40 TABLET ORAL DAILY
Qty: 30 TABLET | Refills: 5 | OUTPATIENT
Start: 2025-03-10

## 2025-03-10 RX ORDER — AMLODIPINE BESYLATE 5 MG/1
5 TABLET ORAL DAILY
Qty: 30 TABLET | Refills: 5 | OUTPATIENT
Start: 2025-03-10

## 2025-03-26 ENCOUNTER — OFFICE VISIT (OUTPATIENT)
Dept: PRIMARY CARE | Facility: CLINIC | Age: 35
End: 2025-03-26
Payer: COMMERCIAL

## 2025-03-26 VITALS
OXYGEN SATURATION: 100 % | DIASTOLIC BLOOD PRESSURE: 78 MMHG | HEIGHT: 71 IN | RESPIRATION RATE: 16 BRPM | WEIGHT: 315 LBS | SYSTOLIC BLOOD PRESSURE: 119 MMHG | HEART RATE: 100 BPM | BODY MASS INDEX: 44.1 KG/M2 | TEMPERATURE: 98 F

## 2025-03-26 DIAGNOSIS — R73.03 PREDIABETES: Primary | ICD-10-CM

## 2025-03-26 DIAGNOSIS — F41.9 ANXIETY: ICD-10-CM

## 2025-03-26 DIAGNOSIS — Z21 HIV INFECTION, UNSPECIFIED SYMPTOM STATUS: ICD-10-CM

## 2025-03-26 DIAGNOSIS — I10 PRIMARY HYPERTENSION: ICD-10-CM

## 2025-03-26 DIAGNOSIS — R79.89 LOW VITAMIN D LEVEL: ICD-10-CM

## 2025-03-26 PROCEDURE — 36415 COLL VENOUS BLD VENIPUNCTURE: CPT | Performed by: INTERNAL MEDICINE

## 2025-03-26 PROCEDURE — 99214 OFFICE O/P EST MOD 30 MIN: CPT | Performed by: INTERNAL MEDICINE

## 2025-03-26 PROCEDURE — 3074F SYST BP LT 130 MM HG: CPT | Performed by: INTERNAL MEDICINE

## 2025-03-26 PROCEDURE — 1036F TOBACCO NON-USER: CPT | Performed by: INTERNAL MEDICINE

## 2025-03-26 PROCEDURE — 3078F DIAST BP <80 MM HG: CPT | Performed by: INTERNAL MEDICINE

## 2025-03-26 PROCEDURE — 3008F BODY MASS INDEX DOCD: CPT | Performed by: INTERNAL MEDICINE

## 2025-03-26 RX ORDER — HYDROCHLOROTHIAZIDE 25 MG/1
25 TABLET ORAL DAILY
Qty: 90 TABLET | Refills: 1 | Status: SHIPPED | OUTPATIENT
Start: 2025-03-26

## 2025-03-26 RX ORDER — AMLODIPINE BESYLATE 5 MG/1
5 TABLET ORAL DAILY
Qty: 90 TABLET | Refills: 1 | Status: SHIPPED | OUTPATIENT
Start: 2025-03-26

## 2025-03-26 RX ORDER — LISINOPRIL 40 MG/1
40 TABLET ORAL DAILY
Qty: 90 TABLET | Refills: 1 | Status: SHIPPED | OUTPATIENT
Start: 2025-03-26

## 2025-03-26 SDOH — ECONOMIC STABILITY: FOOD INSECURITY: WITHIN THE PAST 12 MONTHS, THE FOOD YOU BOUGHT JUST DIDN'T LAST AND YOU DIDN'T HAVE MONEY TO GET MORE.: NEVER TRUE

## 2025-03-26 SDOH — ECONOMIC STABILITY: FOOD INSECURITY: WITHIN THE PAST 12 MONTHS, YOU WORRIED THAT YOUR FOOD WOULD RUN OUT BEFORE YOU GOT MONEY TO BUY MORE.: NEVER TRUE

## 2025-03-26 ASSESSMENT — ENCOUNTER SYMPTOMS
OCCASIONAL FEELINGS OF UNSTEADINESS: 0
SHORTNESS OF BREATH: 0
DEPRESSION: 0
VOMITING: 0
FEVER: 0
LOSS OF SENSATION IN FEET: 0
NAUSEA: 0
CHILLS: 0
ABDOMINAL PAIN: 0

## 2025-03-26 ASSESSMENT — PATIENT HEALTH QUESTIONNAIRE - PHQ9
1. LITTLE INTEREST OR PLEASURE IN DOING THINGS: NOT AT ALL
SUM OF ALL RESPONSES TO PHQ9 QUESTIONS 1 AND 2: 0
2. FEELING DOWN, DEPRESSED OR HOPELESS: NOT AT ALL

## 2025-03-26 ASSESSMENT — PAIN SCALES - GENERAL: PAINLEVEL_OUTOF10: 0-NO PAIN

## 2025-03-26 ASSESSMENT — LIFESTYLE VARIABLES: HOW OFTEN DO YOU HAVE SIX OR MORE DRINKS ON ONE OCCASION: LESS THAN MONTHLY

## 2025-03-26 NOTE — PROGRESS NOTES
"Subjective   Patient ID: Sukhdev Manzano is a 34 y.o. male who presents for Follow-up.    Presents for follow up. No acute issues. Trying to lose weight. Goes to the Gym. Saw IVET. No change in meds. Not taking anxiety meds. Feels he doesn't need it but does endorse some anxiousness at times. No SI/HI.         Review of Systems   Constitutional:  Negative for chills and fever.   Respiratory:  Negative for shortness of breath.    Cardiovascular:  Negative for chest pain.   Gastrointestinal:  Negative for abdominal pain, nausea and vomiting.       Objective   /78   Pulse 100   Temp 36.7 °C (98 °F) (Temporal)   Resp 16   Ht 1.803 m (5' 11\")   Wt 146 kg (321 lb 12.8 oz)   SpO2 100%   BMI 44.88 kg/m²     Physical Exam  Gen appearance: well groomed in NAD  A & O: alert and oriented x 3  CV: RRR   Lungs: CTA bilaterally  Extr: no edema   Assessment/Plan   HTN: cont meds  Prediabetes: check A1c  Low vitamin d: will purchase OTC. Not covered by insurance.  HIV: stable  Anxiety: monitor off of med  RTO Aug for CPE  Greater than 35 minutes were spent on the care and coordination of care of the patient.        "

## 2025-03-27 LAB
EST. AVERAGE GLUCOSE BLD GHB EST-MCNC: 131 MG/DL
EST. AVERAGE GLUCOSE BLD GHB EST-SCNC: 7.3 MMOL/L
HBA1C MFR BLD: 6.2 % OF TOTAL HGB

## 2025-04-09 DIAGNOSIS — F41.9 ANXIETY: ICD-10-CM

## 2025-04-09 RX ORDER — ESCITALOPRAM OXALATE 10 MG/1
10 TABLET ORAL DAILY
Qty: 90 TABLET | Refills: 1 | OUTPATIENT
Start: 2025-04-09

## 2025-06-19 ENCOUNTER — OFFICE VISIT (OUTPATIENT)
Dept: PRIMARY CARE | Facility: CLINIC | Age: 35
End: 2025-06-19
Payer: COMMERCIAL

## 2025-06-19 VITALS
BODY MASS INDEX: 44.1 KG/M2 | TEMPERATURE: 97.9 F | DIASTOLIC BLOOD PRESSURE: 89 MMHG | SYSTOLIC BLOOD PRESSURE: 133 MMHG | OXYGEN SATURATION: 97 % | RESPIRATION RATE: 16 BRPM | HEIGHT: 71 IN | WEIGHT: 315 LBS | HEART RATE: 101 BPM

## 2025-06-19 DIAGNOSIS — I10 PRIMARY HYPERTENSION: ICD-10-CM

## 2025-06-19 DIAGNOSIS — R73.03 PREDIABETES: Primary | ICD-10-CM

## 2025-06-19 PROCEDURE — 1036F TOBACCO NON-USER: CPT | Performed by: INTERNAL MEDICINE

## 2025-06-19 PROCEDURE — 3079F DIAST BP 80-89 MM HG: CPT | Performed by: INTERNAL MEDICINE

## 2025-06-19 PROCEDURE — 99213 OFFICE O/P EST LOW 20 MIN: CPT | Performed by: INTERNAL MEDICINE

## 2025-06-19 PROCEDURE — 3008F BODY MASS INDEX DOCD: CPT | Performed by: INTERNAL MEDICINE

## 2025-06-19 PROCEDURE — 3075F SYST BP GE 130 - 139MM HG: CPT | Performed by: INTERNAL MEDICINE

## 2025-06-19 ASSESSMENT — ENCOUNTER SYMPTOMS
SHORTNESS OF BREATH: 0
FEVER: 0
ABDOMINAL PAIN: 0
NAUSEA: 0
LOSS OF SENSATION IN FEET: 0
VOMITING: 0
CHILLS: 0
OCCASIONAL FEELINGS OF UNSTEADINESS: 0

## 2025-06-19 ASSESSMENT — PAIN SCALES - GENERAL: PAINLEVEL_OUTOF10: 0-NO PAIN

## 2025-06-19 NOTE — PROGRESS NOTES
"Subjective   Patient ID: Sukhdev Manzano is a 34 y.o. male who presents for Follow-up and Diabetes.    Presents for follow up of prediabetes diagnosis. States has cut out a lot of sugars and is still working on reducing carb intake. BP a little elevated today because he just the meds before coming here.     Diabetes  Pertinent negatives for diabetes include no chest pain.        Review of Systems   Constitutional:  Negative for chills and fever.   Respiratory:  Negative for shortness of breath.    Cardiovascular:  Negative for chest pain.   Gastrointestinal:  Negative for abdominal pain, nausea and vomiting.       Objective   /89   Pulse 101   Temp 36.6 °C (97.9 °F)   Resp 16   Ht 1.803 m (5' 11\")   Wt 148 kg (326 lb)   SpO2 97%   BMI 45.47 kg/m²     Physical Exam  Gen appearance: well groomed in NAD  A & O: alert and oriented x 3  CV: RRR   Lungs: CTA bilaterally  Extr: no edema   Assessment/Plan   1.Prediabetes: check A1c  2. HTN: cont med  3.  Keep Aug appt         "

## 2025-06-20 LAB
EST. AVERAGE GLUCOSE BLD GHB EST-MCNC: 114 MG/DL
EST. AVERAGE GLUCOSE BLD GHB EST-SCNC: 6.3 MMOL/L
HBA1C MFR BLD: 5.6 %

## 2025-06-27 ENCOUNTER — APPOINTMENT (OUTPATIENT)
Dept: PRIMARY CARE | Facility: CLINIC | Age: 35
End: 2025-06-27
Payer: COMMERCIAL

## 2025-07-21 ENCOUNTER — OFFICE VISIT (OUTPATIENT)
Dept: IMMUNOLOGY | Facility: CLINIC | Age: 35
End: 2025-07-21
Payer: COMMERCIAL

## 2025-07-21 ENCOUNTER — DOCUMENTATION (OUTPATIENT)
Dept: IMMUNOLOGY | Facility: CLINIC | Age: 35
End: 2025-07-21

## 2025-07-21 VITALS
BODY MASS INDEX: 44.1 KG/M2 | OXYGEN SATURATION: 98 % | HEIGHT: 71 IN | SYSTOLIC BLOOD PRESSURE: 132 MMHG | RESPIRATION RATE: 16 BRPM | HEART RATE: 108 BPM | DIASTOLIC BLOOD PRESSURE: 79 MMHG | WEIGHT: 315 LBS

## 2025-07-21 DIAGNOSIS — N18.32 STAGE 3B CHRONIC KIDNEY DISEASE (MULTI): ICD-10-CM

## 2025-07-21 DIAGNOSIS — I10 HYPERTENSION, UNSPECIFIED TYPE: ICD-10-CM

## 2025-07-21 DIAGNOSIS — Z79.899 HIGH RISK MEDICATION USE: ICD-10-CM

## 2025-07-21 DIAGNOSIS — Z11.3 ROUTINE SCREENING FOR STI (SEXUALLY TRANSMITTED INFECTION): ICD-10-CM

## 2025-07-21 DIAGNOSIS — Z21 ASYMPTOMATIC HIV INFECTION, WITH NO HISTORY OF HIV-RELATED ILLNESS (MULTI): Primary | ICD-10-CM

## 2025-07-21 LAB
ALBUMIN SERPL BCP-MCNC: 5.1 G/DL (ref 3.4–5)
ALP SERPL-CCNC: 51 U/L (ref 33–120)
ALT SERPL W P-5'-P-CCNC: 16 U/L (ref 10–52)
ANION GAP SERPL CALC-SCNC: 13 MMOL/L (ref 10–20)
AST SERPL W P-5'-P-CCNC: 17 U/L (ref 9–39)
BASOPHILS # BLD AUTO: 0.08 X10*3/UL (ref 0–0.1)
BASOPHILS NFR BLD AUTO: 0.7 %
BILIRUB SERPL-MCNC: 0.6 MG/DL (ref 0–1.2)
BUN SERPL-MCNC: 33 MG/DL (ref 6–23)
CALCIUM SERPL-MCNC: 10.7 MG/DL (ref 8.6–10.6)
CHLORIDE SERPL-SCNC: 104 MMOL/L (ref 98–107)
CO2 SERPL-SCNC: 29 MMOL/L (ref 21–32)
CREAT SERPL-MCNC: 2.06 MG/DL (ref 0.5–1.3)
CREAT UR-MCNC: 126.9 MG/DL (ref 20–370)
EGFRCR SERPLBLD CKD-EPI 2021: 42 ML/MIN/1.73M*2
EOSINOPHIL # BLD AUTO: 0.3 X10*3/UL (ref 0–0.7)
EOSINOPHIL NFR BLD AUTO: 2.7 %
ERYTHROCYTE [DISTWIDTH] IN BLOOD BY AUTOMATED COUNT: 12.6 % (ref 11.5–14.5)
GLUCOSE SERPL-MCNC: 108 MG/DL (ref 74–99)
HCT VFR BLD AUTO: 46.8 % (ref 41–52)
HGB BLD-MCNC: 15.1 G/DL (ref 13.5–17.5)
IMM GRANULOCYTES # BLD AUTO: 0.04 X10*3/UL (ref 0–0.7)
IMM GRANULOCYTES NFR BLD AUTO: 0.4 % (ref 0–0.9)
LYMPHOCYTES # BLD AUTO: 3.56 X10*3/UL (ref 1.2–4.8)
LYMPHOCYTES NFR BLD AUTO: 31.6 %
MCH RBC QN AUTO: 32.1 PG (ref 26–34)
MCHC RBC AUTO-ENTMCNC: 32.3 G/DL (ref 32–36)
MCV RBC AUTO: 100 FL (ref 80–100)
MICROALBUMIN UR-MCNC: 64.7 MG/L
MICROALBUMIN/CREAT UR: 51 UG/MG CREAT
MONOCYTES # BLD AUTO: 0.51 X10*3/UL (ref 0.1–1)
MONOCYTES NFR BLD AUTO: 4.5 %
NEUTROPHILS # BLD AUTO: 6.77 X10*3/UL (ref 1.2–7.7)
NEUTROPHILS NFR BLD AUTO: 60.1 %
NRBC BLD-RTO: 0 /100 WBCS (ref 0–0)
PHOSPHATE SERPL-MCNC: 4.5 MG/DL (ref 2.5–4.9)
PLATELET # BLD AUTO: 248 X10*3/UL (ref 150–450)
POTASSIUM SERPL-SCNC: 3.9 MMOL/L (ref 3.5–5.3)
PROT SERPL-MCNC: 8.4 G/DL (ref 6.4–8.2)
RBC # BLD AUTO: 4.7 X10*6/UL (ref 4.5–5.9)
SODIUM SERPL-SCNC: 142 MMOL/L (ref 136–145)
TREPONEMA PALLIDUM IGG+IGM AB [PRESENCE] IN SERUM OR PLASMA BY IMMUNOASSAY: NONREACTIVE
WBC # BLD AUTO: 11.3 X10*3/UL (ref 4.4–11.3)

## 2025-07-21 PROCEDURE — 99214 OFFICE O/P EST MOD 30 MIN: CPT | Performed by: INTERNAL MEDICINE

## 2025-07-21 PROCEDURE — 84100 ASSAY OF PHOSPHORUS: CPT | Performed by: INTERNAL MEDICINE

## 2025-07-21 PROCEDURE — 3075F SYST BP GE 130 - 139MM HG: CPT | Performed by: INTERNAL MEDICINE

## 2025-07-21 PROCEDURE — 87491 CHLMYD TRACH DNA AMP PROBE: CPT | Performed by: INTERNAL MEDICINE

## 2025-07-21 PROCEDURE — 87536 HIV-1 QUANT&REVRSE TRNSCRPJ: CPT | Performed by: INTERNAL MEDICINE

## 2025-07-21 PROCEDURE — 3078F DIAST BP <80 MM HG: CPT | Performed by: INTERNAL MEDICINE

## 2025-07-21 PROCEDURE — 3008F BODY MASS INDEX DOCD: CPT | Performed by: INTERNAL MEDICINE

## 2025-07-21 PROCEDURE — 88185 FLOWCYTOMETRY/TC ADD-ON: CPT | Performed by: INTERNAL MEDICINE

## 2025-07-21 PROCEDURE — 36415 COLL VENOUS BLD VENIPUNCTURE: CPT | Performed by: INTERNAL MEDICINE

## 2025-07-21 PROCEDURE — 85025 COMPLETE CBC W/AUTO DIFF WBC: CPT | Performed by: INTERNAL MEDICINE

## 2025-07-21 PROCEDURE — 80053 COMPREHEN METABOLIC PANEL: CPT | Performed by: INTERNAL MEDICINE

## 2025-07-21 PROCEDURE — 86780 TREPONEMA PALLIDUM: CPT | Performed by: INTERNAL MEDICINE

## 2025-07-21 PROCEDURE — 82043 UR ALBUMIN QUANTITATIVE: CPT | Performed by: INTERNAL MEDICINE

## 2025-07-21 ASSESSMENT — PAIN SCALES - GENERAL: PAINLEVEL_OUTOF10: 0-NO PAIN

## 2025-07-21 NOTE — PROGRESS NOTES
"HIV Clinic Follow-up Visit:    Sukhdev Manzano was last seen in [facility] on 1/22/2025    Missed antiretroviral doses in last 72 hours? No    Sexually active? yes, Partner/s aware of diagnosis? yes,     Condom use? Always, Partner on PrEP? No   We have discussed and pt aware of U=U.   Tobacco use: No uses Marijuana on occassion  Dental visit: planning to schedule  EtOH: 1-2 drinks over weekends    SUBJECTIVE:   Nausea that had onset with ARVs now resolved.  He has cut all sugared beverages from diet and A1c declined from 6.2 to 5.6.  Feels better. He has lost 4 kg.  Sexually active with same partner. He is bottom only with condoms always. Oral without condoms.  No sore throat.  Occasionally wakes up with dry mouth.  No heartburn or sour taste in mouth in AM.  Tries to stay hydrated but difficult since he has cut out sugared beverages. Drinks water but does not drink a lot. Urine is always light yellow, at best.  We discussed using urine color as guide to hydration status.   Cannot quantify output.  No muscle cramping    Review of Systems  Full 10 point ROS performed.  All pertinent positives and negatives as documented in HPI.     CURRENT MEDICATIONS:  Current Medications[1]    PHYSICAL EXAMINATION:  Visit Vitals  /79 (BP Location: Left arm, Patient Position: Sitting, BP Cuff Size: Large adult)   Pulse 108   Resp 16   Ht 1.803 m (5' 11\")   Wt 144 kg (317 lb)   SpO2 98%   BMI 44.21 kg/m²   Smoking Status Former   BSA 2.69 m²       Physical Exam   Physical Exam  Vitals reviewed.   Constitutional:       General: He is not in acute distress.     Appearance: Normal appearance. He is obese. He is not toxic-appearing.   HENT:      Head: Normocephalic and atraumatic.      Mouth/Throat:      Mouth: Mucous membranes are moist.      Pharynx: Oropharynx is clear.     Eyes:      Extraocular Movements: Extraocular movements intact.      Conjunctiva/sclera: Conjunctivae normal.      Pupils: Pupils are equal, round, and reactive " to light.       Cardiovascular:      Rate and Rhythm: Normal rate and regular rhythm.      Heart sounds: Normal heart sounds.   Pulmonary:      Effort: Pulmonary effort is normal.      Breath sounds: Normal breath sounds.   Abdominal:      General: Abdomen is flat. Bowel sounds are normal. There is no distension.      Palpations: Abdomen is soft. There is no mass.      Tenderness: There is no abdominal tenderness.     Musculoskeletal:      Cervical back: Normal range of motion and neck supple. No rigidity or tenderness.   Lymphadenopathy:      Cervical: No cervical adenopathy.     Skin:     General: Skin is warm and dry.      Coloration: Skin is not jaundiced.     Neurological:      General: No focal deficit present.      Mental Status: He is alert and oriented to person, place, and time.      Cranial Nerves: No cranial nerve deficit.      Motor: No weakness.      Deep Tendon Reflexes: Reflexes normal.         PERTINENT DATA:  CBC:  WBC   Date Value Ref Range Status   07/21/2025 11.3 4.4 - 11.3 x10*3/uL Final     nRBC   Date Value Ref Range Status   07/21/2025 0.0 0.0 - 0.0 /100 WBCs Final     RBC   Date Value Ref Range Status   07/21/2025 4.70 4.50 - 5.90 x10*6/uL Final     Hemoglobin   Date Value Ref Range Status   07/21/2025 15.1 13.5 - 17.5 g/dL Final     MCV   Date Value Ref Range Status   07/21/2025 100 80 - 100 fL Final     RDW   Date Value Ref Range Status   07/21/2025 12.6 11.5 - 14.5 % Final       Renal Function Panel:  Glucose   Date Value Ref Range Status   07/21/2025 108 (H) 74 - 99 mg/dL Final     Sodium   Date Value Ref Range Status   07/21/2025 142 136 - 145 mmol/L Final     Potassium   Date Value Ref Range Status   07/21/2025 3.9 3.5 - 5.3 mmol/L Final     Chloride   Date Value Ref Range Status   07/21/2025 104 98 - 107 mmol/L Final     Anion Gap   Date Value Ref Range Status   07/21/2025 13 10 - 20 mmol/L Final     Urea Nitrogen   Date Value Ref Range Status   07/21/2025 33 (H) 6 - 23 mg/dL Final      Creatinine   Date Value Ref Range Status   07/21/2025 2.06 (H) 0.50 - 1.30 mg/dL Final     eGFR   Date Value Ref Range Status   07/21/2025 42 (L) >60 mL/min/1.73m*2 Final     Comment:     Calculations of estimated GFR are performed using the 2021 CKD-EPI Study Refit equation without the race variable for the IDMS-Traceable creatinine methods.  https://jasn.asnjournals.org/content/early/2021/09/22/ASN.4339445957     Calcium   Date Value Ref Range Status   07/21/2025 10.7 (H) 8.6 - 10.6 mg/dL Final     Phosphorus   Date Value Ref Range Status   07/21/2025 4.5 2.5 - 4.9 mg/dL Final     Albumin   Date Value Ref Range Status   07/21/2025 5.1 (H) 3.4 - 5.0 g/dL Final       Hepatic Panel:  Albumin   Date Value Ref Range Status   07/21/2025 5.1 (H) 3.4 - 5.0 g/dL Final     Bilirubin, Total   Date Value Ref Range Status   07/21/2025 0.6 0.0 - 1.2 mg/dL Final     Alkaline Phosphatase   Date Value Ref Range Status   07/21/2025 51 33 - 120 U/L Final     ALT   Date Value Ref Range Status   07/21/2025 16 10 - 52 U/L Final     Comment:     Patients treated with Sulfasalazine may generate falsely decreased results for ALT.       HIV Viral Load:  HIV-1 RNA PCR Viral Load Log   Date Value Ref Range Status   01/20/2025   Final     Comment:     Not calculated     HIV RNA Result   Date Value Ref Range Status   01/20/2025 Not Detected Not Detected Final       CD4 Count:  CD3+CD4+%   Date Value Ref Range Status   01/20/2025 15 (L) 29 - 57 % Final     CD3+CD4+ Absolute   Date Value Ref Range Status   01/20/2025 0.402 0.350 - 2.740 x10E9/L Final     CD3+CD8+%   Date Value Ref Range Status   01/20/2025 70 (H) 7 - 31 % Final     CD3+CD8+ Absolute   Date Value Ref Range Status   01/20/2025 1.876 (H) 0.080 - 1.490 x10E9/L Final     CD4/CD8 Ratio   Date Value Ref Range Status   01/20/2025 0.21 (L) 1.00 - 2.60 Final         Lipid Panel:  HDL-Cholesterol   Date Value Ref Range Status   08/26/2024 45.0 mg/dL Final     Comment:       Age        Very Low   Low     Normal    High    0-19 Y    < 35      < 40     40-45     ----  20-24 Y    ----     < 40      >45      ----        >24 Y      ----     < 40     40-60      >60       Cholesterol/HDL Ratio   Date Value Ref Range Status   08/26/2024 5.7  Final     Comment:       Ref Values  Desirable  < 3.4  High Risk  > 5.0     LDL   Date Value Ref Range Status   08/24/2022 105 (H) 0 - 99 mg/dL Final     Comment:     .                           NEAR      BORD      AGE      DESIRABLE  OPTIMAL    HIGH     HIGH     VERY HIGH     0-19 Y     0 - 109     ---    110-129   >/= 130     ----    20-24 Y     0 - 119     ---    120-159   >/= 160     ----      >24 Y     0 -  99   100-129  130-159   160-189     >/=190  .       VLDL   Date Value Ref Range Status   08/26/2024 28 0 - 40 mg/dL Final     Triglycerides   Date Value Ref Range Status   08/26/2024 139 0 - 149 mg/dL Final     Comment:        Age         Desirable   Borderline High   High     Very High   0 D-90 D    19 - 174         ----         ----        ----  91 D- 9 Y     0 -  74        75 -  99     >/= 100      ----    10-19 Y     0 -  89        90 - 129     >/= 130      ----    20-24 Y     0 - 114       115 - 149     >/= 150      ----         >24 Y     0 - 149       150 - 199    200- 499    >/= 500    Venipuncture immediately after or during the administration of Metamizole may lead to falsely low results. Testing should be performed immediately prior to Metamizole dosing.       HgbA1c:  HEMOGLOBIN A1c   Date Value Ref Range Status   06/19/2025 5.6 <5.7 % Final     Comment:     For the purpose of screening for the presence of  diabetes:     <5.7%       Consistent with the absence of diabetes  5.7-6.4%    Consistent with increased risk for diabetes              (prediabetes)  > or =6.5%  Consistent with diabetes     This assay result is consistent with a decreased risk  of diabetes.     Currently, no consensus exists regarding use of  hemoglobin A1c for diagnosis of  diabetes in children.     According to American Diabetes Association (ADA)  guidelines, hemoglobin A1c <7.0% represents optimal  control in non-pregnant diabetic patients. Different  metrics may apply to specific patient populations.   Standards of Medical Care in Diabetes(ADA).             The ASCVD Risk score (Eb SHAW, et al., 2019) failed to calculate for the following reasons:    The 2019 ASCVD risk score is only valid for ages 40 to 79    ASSESSMENT / PLAN:    Problem List Items Addressed This Visit       HTN (hypertension)    Relevant Orders    Vascular US renal artery duplex complete    HIV infection - Primary    Overview   HIV history entered by HIV care provider: PLEASE DO NOT DELETE     Initial diagnosis:  8/26/24  CD4 jorge of 335 / 12% on 9/6/24.    Pertinent Screens:  HAV Ab:  Non-reactive 9/6/24  HBsAg:  Non-reactive 9/6/24  HBsAb:  Non-reactive 9/6/24  HBcAb:  Non-reactive 9/6/24  HCV Ab:  Non-reactive 9/6/24  CMV IgG:  Positive 9/6/24  Toxo IgG:  Non-reactive 9/6/24  G6PD:  Normal  9/6/24  HLA B57-01:  Negative  9/6/24  Syphilis IgG:  Non-reactive 9/6/24  PPD/IGRA:  Negative  9/6/24  HIV Genotype:  9/6/24  Major Mutation Hx: K103N    HAART Hx:  Biktarvy: 9/10/24 - current    HIV associated illnesses/Nicole:  None           Current Assessment & Plan   Excellent adherence.  Remains virologically suppressed and immunologically intact  Routine labs collected today: CBC w/diff, CMP, Phos, CD4, and Viral load  STD screen for GC/CT (urine/throat), syphilis IgG  RTC 6 months         Relevant Orders    CD4/8 Panel    Comprehensive Metabolic Panel (Completed)    CBC and Auto Differential (Completed)    HIV RNA, quantitative, PCR    Stage 3b chronic kidney disease (Multi)    Overview   Rising creatinine since 8/2021.  Currently stage 3b  No symptoms of renal disease, clinically         Current Assessment & Plan   Renal US ordered  Will check renal vascular study as well.  Urine albumin-creatinine ratio  ordered         Relevant Orders    Albumin-Creatinine Ratio, Random Urine (Completed)    US native renal with doppler    Vascular US renal artery duplex complete     Other Visit Diagnoses         Routine screening for STI (sexually transmitted infection)        Relevant Orders    C. trachomatis / N. gonorrhoeae, Amplified, Urogenital    C. trachomatis / N. gonorrhoeae, Amplified, Throat    Syphilis Screen with Reflex (Completed)      High risk medication use        Relevant Orders    Phosphorus (Completed)            Elida Matta MD           [1]   Current Outpatient Medications:     amLODIPine (Norvasc) 5 mg tablet, Take 1 tablet (5 mg) by mouth once daily., Disp: 90 tablet, Rfl: 1    Biktarvy -25 mg tablet, Take 1 tablet by mouth once daily., Disp: 30 tablet, Rfl: 5    hydroCHLOROthiazide (HYDRODiuril) 25 mg tablet, Take 1 tablet (25 mg) by mouth once daily., Disp: 90 tablet, Rfl: 1    lisinopril 40 mg tablet, Take 1 tablet (40 mg) by mouth once daily., Disp: 90 tablet, Rfl: 1

## 2025-07-21 NOTE — PROGRESS NOTES
Time Spent: 15 mins   EARLY INTERVENTION ENCOUNTER PLAN  EIS Encounter Definition:    [x] New Diagnosis [] Referral  [] Receiving HIV/AIDS Services without PCP [] Returning to Care  [] Unaware of HIV Status  [x] Retaining in Care      Does the patient understand the patient grievance procedures? ([x]Y or []N)  Speak Up - About your care information provided and reviewed with patient? ([x]Y or []N)  Is the patient aware who to call in the IVET when they have questions? ([]Y or [x]N)      Gauge patients understanding on the following: (Scale: 1=No knowledge - 5=Very knowledgeable)  How is HIV transmitted?  [] 1 [] 2 [] 3 [] 4 [x] 5  U=U (Undetectable = Untransmittable)  [] 1 [] 2 [] 3 [] 4 [x] 5  ART/ARV (Antiretroviral Medication)  [] 1 [] 2 [] 3 [] 4 [x] 5  Viral loads (Amount of virus)  [] 1 [] 2 [] 3 [] 4 [x] 5  CD4 (White blood cells)  [] 1 [] 2 [] 3 [] 4 [x] 5    EIS will f/U with pt on any topics scored below 5.     Third Visit:  Are there any missed appointments? ([]Y or [x]N)     What actions has the  taken to assist patient in maintaining 1 year goal?     Has the patient received at least (2) viral load tests in the last 365 days?  ([x]Y or []N)  Has the patient attended at least (2) established patient visits ([x]Y or []N)  Y - Patient is considered retained in care. EIS will transfer from caseload.    1st Viral Load:  7,960   Date: 09/06/2024 1st Appointment: 09/06/2024  2nd Viral Load: 27  Date: 01/20/2025 2nd Appointment: 01/20/2025    EIS NOTES:   EIS met with pt during EST f/U   EIS conducted wellness check-in   Pt stated reduction of financial stressors/barriers d/t current employment ([employer])   Pt stated no further barriers to care   Pt stated goal to continue accepting status and reports strong sense of perceived well being overall     EIS PLAN:   Client is being transitioned from EIS to [facility] effective today as evidenced by the following:   [x] Attended two visits regularly  (zero no-shows) with ID provider   [x] Attended one year of visits regularly (zero no-shows) with ID provider   [x] Consistent engagement with care team   [x] Follows optimal medication adherence strategies  [x] Viral Load suppression as defined by CDC (<200 copies)  [x] Demonstrates fundamental understanding of care at ID clinic  [x] Demonstrates fundamental understanding of care U=U  [x] Last VL on 07/21/2024 was undetectable and CD4 was     Pt will now f/U with SW (BRUCE Ho) for all ID care coordination requests as pt met all EIS objectives as of 07/22/2025.        Kristal Eduardo  [phone]  [email or web address]

## 2025-07-21 NOTE — LETTER
07/21/25    Ines Sheppard MD  [address]  [place] [address]      Dear Dr. Ines Sheppard MD,    I am writing to confirm that your patient, Sukhdev Manzano, received care in my office on 07/21/25. I have enclosed a summary of the care provided to Sukhdev for your reference.    Please contact me with any questions you may have regarding the visit.    Sincerely,         Elida Matta MD  [address]  [address]  [facility] [place] [facility] [id number] [phone]    CC: No Recipients

## 2025-07-21 NOTE — PATIENT INSTRUCTIONS
It was great to see you today!  Your last blood work that we did in clinic showed that your T-cells (CD4 count) was 402 / 15%.  Your virus was fully suppressed at less than 20 copies.  Keep up the great work taking your medications.  We collected routine blood work today.  I will see you back in clinic in 4 months.  If any issues should arise before then, please remember to call the clinic and get in contact with your nurse.

## 2025-07-22 LAB
C TRACH RRNA SPEC QL NAA+PROBE: NEGATIVE
C TRACH RRNA SPEC QL NAA+PROBE: NEGATIVE
CD3+CD4+ CELLS # BLD: 0.5 X10E9/L (ref 0.35–2.74)
CD3+CD4+ CELLS # BLD: 498 /MM3 (ref 350–2740)
CD3+CD4+ CELLS NFR BLD: 14 % (ref 29–57)
CD3+CD4+ CELLS/CD3+CD8+ CLL BLD: 0.2 % (ref 1–3.5)
CD3+CD8+ CELLS # BLD: 2.53 X10E9/L (ref 0.08–1.49)
CD3+CD8+ CELLS NFR BLD: 71 % (ref 7–31)
HIV1 RNA # PLAS NAA DL=20: NOT DETECTED {COPIES}/ML
HIV1 RNA SPEC NAA+PROBE-LOG#: NORMAL {LOG_COPIES}/ML
LYMPHOCYTES # SPEC AUTO: 3.56 X10*3/UL
N GONORRHOEA DNA SPEC QL PROBE+SIG AMP: NEGATIVE
N GONORRHOEA DNA SPEC QL PROBE+SIG AMP: POSITIVE

## 2025-07-22 NOTE — ASSESSMENT & PLAN NOTE
Renal US ordered  Will check renal vascular study as well.  Urine albumin-creatinine ratio ordered

## 2025-07-22 NOTE — ASSESSMENT & PLAN NOTE
Excellent adherence.  Remains virologically suppressed and immunologically intact  Routine labs collected today: CBC w/diff, CMP, Phos, CD4, and Viral load  STD screen for GC/CT (urine/throat), syphilis IgG  RTC 6 months

## 2025-07-29 ENCOUNTER — CLINICAL SUPPORT (OUTPATIENT)
Dept: IMMUNOLOGY | Facility: CLINIC | Age: 35
End: 2025-07-29
Payer: COMMERCIAL

## 2025-07-29 DIAGNOSIS — A54.9 GONORRHEA: ICD-10-CM

## 2025-07-29 PROCEDURE — 96372 THER/PROPH/DIAG INJ SC/IM: CPT | Performed by: INTERNAL MEDICINE

## 2025-07-29 PROCEDURE — 2500000004 HC RX 250 GENERAL PHARMACY W/ HCPCS (ALT 636 FOR OP/ED): Performed by: INTERNAL MEDICINE

## 2025-07-29 RX ORDER — CEFTRIAXONE 500 MG/1
500 INJECTION, POWDER, FOR SOLUTION INTRAMUSCULAR; INTRAVENOUS ONCE
Status: COMPLETED | OUTPATIENT
Start: 2025-07-29 | End: 2025-07-29

## 2025-07-29 RX ADMIN — CEFTRIAXONE SODIUM 500 MG: 500 INJECTION, POWDER, FOR SOLUTION INTRAMUSCULAR; INTRAVENOUS at 10:46

## 2025-07-29 NOTE — PROGRESS NOTES
Patient here for Ceftriaxone injection. Throat swab is positive for gonorrhea. Injection given in right buttock. Tolerated well. Advised pt take Tylenol for discomfort. Patient can return in 3-4 weeks for test for cure.

## 2025-08-12 ENCOUNTER — HOSPITAL ENCOUNTER (OUTPATIENT)
Dept: RADIOLOGY | Facility: HOSPITAL | Age: 35
Discharge: HOME | End: 2025-08-12
Payer: COMMERCIAL

## 2025-08-12 ENCOUNTER — APPOINTMENT (OUTPATIENT)
Dept: RADIOLOGY | Facility: HOSPITAL | Age: 35
End: 2025-08-12
Payer: COMMERCIAL

## 2025-08-12 DIAGNOSIS — I10 HYPERTENSION, UNSPECIFIED TYPE: ICD-10-CM

## 2025-08-12 DIAGNOSIS — N18.32 STAGE 3B CHRONIC KIDNEY DISEASE (MULTI): ICD-10-CM

## 2025-08-12 PROCEDURE — 76770 US EXAM ABDO BACK WALL COMP: CPT | Mod: 59

## 2025-08-13 ENCOUNTER — OFFICE VISIT (OUTPATIENT)
Dept: PRIMARY CARE | Facility: CLINIC | Age: 35
End: 2025-08-13
Payer: COMMERCIAL

## 2025-08-13 VITALS
TEMPERATURE: 98.1 F | BODY MASS INDEX: 44.1 KG/M2 | RESPIRATION RATE: 16 BRPM | WEIGHT: 315 LBS | SYSTOLIC BLOOD PRESSURE: 137 MMHG | HEART RATE: 100 BPM | HEIGHT: 71 IN | DIASTOLIC BLOOD PRESSURE: 82 MMHG | OXYGEN SATURATION: 98 %

## 2025-08-13 DIAGNOSIS — R73.03 PREDIABETES: ICD-10-CM

## 2025-08-13 DIAGNOSIS — Z00.00 ENCOUNTER FOR PREVENTIVE HEALTH EXAMINATION: Primary | ICD-10-CM

## 2025-08-13 DIAGNOSIS — R06.83 LOUD SNORING: ICD-10-CM

## 2025-08-13 PROCEDURE — 99213 OFFICE O/P EST LOW 20 MIN: CPT | Performed by: INTERNAL MEDICINE

## 2025-08-13 PROCEDURE — 3008F BODY MASS INDEX DOCD: CPT | Performed by: INTERNAL MEDICINE

## 2025-08-13 PROCEDURE — 3075F SYST BP GE 130 - 139MM HG: CPT | Performed by: INTERNAL MEDICINE

## 2025-08-13 PROCEDURE — 99395 PREV VISIT EST AGE 18-39: CPT | Performed by: INTERNAL MEDICINE

## 2025-08-13 PROCEDURE — 36415 COLL VENOUS BLD VENIPUNCTURE: CPT | Performed by: INTERNAL MEDICINE

## 2025-08-13 PROCEDURE — 3079F DIAST BP 80-89 MM HG: CPT | Performed by: INTERNAL MEDICINE

## 2025-08-13 SDOH — ECONOMIC STABILITY: FOOD INSECURITY: WITHIN THE PAST 12 MONTHS, YOU WORRIED THAT YOUR FOOD WOULD RUN OUT BEFORE YOU GOT MONEY TO BUY MORE.: OFTEN TRUE

## 2025-08-13 SDOH — ECONOMIC STABILITY: FOOD INSECURITY: WITHIN THE PAST 12 MONTHS, THE FOOD YOU BOUGHT JUST DIDN'T LAST AND YOU DIDN'T HAVE MONEY TO GET MORE.: OFTEN TRUE

## 2025-08-13 ASSESSMENT — ENCOUNTER SYMPTOMS
DEPRESSION: 0
FEVER: 0
LOSS OF SENSATION IN FEET: 0
OCCASIONAL FEELINGS OF UNSTEADINESS: 0
VOMITING: 0
ABDOMINAL PAIN: 0
SHORTNESS OF BREATH: 0
CHILLS: 0
NAUSEA: 0

## 2025-08-13 ASSESSMENT — PROMIS GLOBAL HEALTH SCALE
RATE_QUALITY_OF_LIFE: FAIR
RATE_AVERAGE_PAIN: 1
RATE_MENTAL_HEALTH: FAIR
RATE_SOCIAL_SATISFACTION: FAIR
RATE_GENERAL_HEALTH: FAIR
CARRYOUT_SOCIAL_ACTIVITIES: GOOD
CARRYOUT_PHYSICAL_ACTIVITIES: COMPLETELY
RATE_AVERAGE_FATIGUE: MILD
RATE_PHYSICAL_HEALTH: FAIR
EMOTIONAL_PROBLEMS: OFTEN

## 2025-08-13 ASSESSMENT — LIFESTYLE VARIABLES: HOW MANY STANDARD DRINKS CONTAINING ALCOHOL DO YOU HAVE ON A TYPICAL DAY: 3 OR 4

## 2025-08-13 ASSESSMENT — PATIENT HEALTH QUESTIONNAIRE - PHQ9
SUM OF ALL RESPONSES TO PHQ9 QUESTIONS 1 AND 2: 0
1. LITTLE INTEREST OR PLEASURE IN DOING THINGS: NOT AT ALL
2. FEELING DOWN, DEPRESSED OR HOPELESS: NOT AT ALL

## 2025-08-13 ASSESSMENT — PAIN SCALES - GENERAL: PAINLEVEL_OUTOF10: 0-NO PAIN

## 2025-08-14 LAB
25(OH)D3+25(OH)D2 SERPL-MCNC: 20 NG/ML (ref 30–100)
CHOLEST SERPL-MCNC: 216 MG/DL
CHOLEST/HDLC SERPL: 5.4 (CALC)
HDLC SERPL-MCNC: 40 MG/DL
LDLC SERPL CALC-MCNC: 139 MG/DL (CALC)
NONHDLC SERPL-MCNC: 176 MG/DL (CALC)
TRIGL SERPL-MCNC: 228 MG/DL
TSH SERPL-ACNC: 0.87 MIU/L (ref 0.4–4.5)

## 2025-08-15 ENCOUNTER — TELEPHONE (OUTPATIENT)
Dept: PRIMARY CARE | Facility: CLINIC | Age: 35
End: 2025-08-15
Payer: COMMERCIAL

## 2025-08-15 ENCOUNTER — RESULTS FOLLOW-UP (OUTPATIENT)
Dept: PRIMARY CARE | Facility: CLINIC | Age: 35
End: 2025-08-15
Payer: COMMERCIAL

## 2025-08-15 DIAGNOSIS — F41.9 ANXIETY: Primary | ICD-10-CM

## 2025-08-15 DIAGNOSIS — R79.89 LOW VITAMIN D LEVEL: Primary | ICD-10-CM

## 2025-08-15 RX ORDER — ESCITALOPRAM OXALATE 20 MG/1
20 TABLET ORAL DAILY
Qty: 90 TABLET | Refills: 1 | Status: SHIPPED | OUTPATIENT
Start: 2025-08-15 | End: 2026-02-11

## 2025-08-15 RX ORDER — ERGOCALCIFEROL 1.25 MG/1
1.25 CAPSULE ORAL WEEKLY
Qty: 12 CAPSULE | Refills: 0 | Status: SHIPPED | OUTPATIENT
Start: 2025-08-15 | End: 2025-11-07